# Patient Record
Sex: MALE | Race: WHITE | NOT HISPANIC OR LATINO | Employment: OTHER | URBAN - METROPOLITAN AREA
[De-identification: names, ages, dates, MRNs, and addresses within clinical notes are randomized per-mention and may not be internally consistent; named-entity substitution may affect disease eponyms.]

---

## 2023-03-22 LAB — PROSTATE SPECIFIC AG (NG/ML) IN SER/PLAS: 9.6 NG/ML (ref 0–4)

## 2023-07-18 RX ORDER — APIXABAN 5 MG/1
5 TABLET, FILM COATED ORAL EVERY 12 HOURS
COMMUNITY
Start: 2020-08-18 | End: 2023-07-19 | Stop reason: SDUPTHER

## 2023-07-18 RX ORDER — LISINOPRIL 5 MG/1
5 TABLET ORAL DAILY
COMMUNITY
Start: 2016-01-21 | End: 2023-09-25

## 2023-07-19 ENCOUNTER — OFFICE VISIT (OUTPATIENT)
Dept: PRIMARY CARE | Facility: CLINIC | Age: 73
End: 2023-07-19
Payer: MEDICARE

## 2023-07-19 VITALS
BODY MASS INDEX: 20.16 KG/M2 | HEIGHT: 65 IN | SYSTOLIC BLOOD PRESSURE: 124 MMHG | WEIGHT: 121 LBS | DIASTOLIC BLOOD PRESSURE: 76 MMHG

## 2023-07-19 DIAGNOSIS — E78.2 MIXED HYPERLIPIDEMIA: ICD-10-CM

## 2023-07-19 DIAGNOSIS — Z86.718 H/O DEEP VENOUS THROMBOSIS: ICD-10-CM

## 2023-07-19 DIAGNOSIS — R73.03 PRE-DIABETES: Primary | ICD-10-CM

## 2023-07-19 DIAGNOSIS — Z00.00 HEALTH CARE MAINTENANCE: ICD-10-CM

## 2023-07-19 DIAGNOSIS — I10 PRIMARY HYPERTENSION: ICD-10-CM

## 2023-07-19 DIAGNOSIS — R97.20 ELEVATED PSA: ICD-10-CM

## 2023-07-19 DIAGNOSIS — Z00.00 HEALTHCARE MAINTENANCE: ICD-10-CM

## 2023-07-19 PROCEDURE — 1036F TOBACCO NON-USER: CPT | Performed by: STUDENT IN AN ORGANIZED HEALTH CARE EDUCATION/TRAINING PROGRAM

## 2023-07-19 PROCEDURE — 3078F DIAST BP <80 MM HG: CPT | Performed by: STUDENT IN AN ORGANIZED HEALTH CARE EDUCATION/TRAINING PROGRAM

## 2023-07-19 PROCEDURE — 3074F SYST BP LT 130 MM HG: CPT | Performed by: STUDENT IN AN ORGANIZED HEALTH CARE EDUCATION/TRAINING PROGRAM

## 2023-07-19 PROCEDURE — 99213 OFFICE O/P EST LOW 20 MIN: CPT | Performed by: STUDENT IN AN ORGANIZED HEALTH CARE EDUCATION/TRAINING PROGRAM

## 2023-07-19 PROCEDURE — 1159F MED LIST DOCD IN RCRD: CPT | Performed by: STUDENT IN AN ORGANIZED HEALTH CARE EDUCATION/TRAINING PROGRAM

## 2023-07-19 NOTE — PROGRESS NOTES
"Subjective   Patient ID: Sanjay Morrow is a 72 y.o. male who presents for Follow-up (Med refill).    HPI   Routine fu.  Med refill.  He feels well, exercising regularly.  Review of Systems  12-point ROS reviewed and was negative unless otherwise noted in HPI.    Objective   /76   Ht 1.651 m (5' 5\")   Wt 54.9 kg (121 lb)   BMI 20.14 kg/m²     Physical Exam  GEN: conversant, NAD  HEENT: PERRL, EOMI, MMM  NECK: supple, no carotid bruits appreciated b/l  CV: S1, S2, RRR  PULM: CTAB  ABD: soft, NT, ND  NEURO: no new gross focal deficits  EXT: no sig LE edema  PSYCH: appropriate affect    Assessment/Plan     #L DVT, likely provoked: Patient found to be heterozygous for factor V Leiden mutation and heterozygous prothrombin gene mutation. Lifelong anticoagulation has been recommended by hematology, patient is in agreement with this. Continue Eliquis.     #HTN: stable. Continue lisinopril to 5 mg daily.     #Pre-diabetes: Advised dietary modification, continue exercise.     #Hyperlipidemia- Elevated LDL, but calcium score of 0. No current indication for statin.     #Elevated PSA: Follows regularly with [facility]. Multiple prostate biopsies have not shown evidence of cancer. Monitoring with surveillance.     #Health maintenance: Pneumovax given in 2016. Prevnar 13 received. TDaP given 2016. Received Shingrix. Normal colonoscopy 2019, no repeat advised. Received COVID-19 vaccines.     RTC 6 mo      "

## 2023-08-14 ENCOUNTER — LAB (OUTPATIENT)
Dept: LAB | Facility: LAB | Age: 73
End: 2023-08-14
Payer: MEDICARE

## 2023-08-14 DIAGNOSIS — Z00.00 HEALTHCARE MAINTENANCE: ICD-10-CM

## 2023-08-14 DIAGNOSIS — Z00.00 HEALTH CARE MAINTENANCE: ICD-10-CM

## 2023-08-14 LAB
ALANINE AMINOTRANSFERASE (SGPT) (U/L) IN SER/PLAS: 15 U/L (ref 10–52)
ALBUMIN (G/DL) IN SER/PLAS: 4.3 G/DL (ref 3.4–5)
ALKALINE PHOSPHATASE (U/L) IN SER/PLAS: 44 U/L (ref 33–136)
ANION GAP IN SER/PLAS: 12 MMOL/L (ref 10–20)
ASPARTATE AMINOTRANSFERASE (SGOT) (U/L) IN SER/PLAS: 18 U/L (ref 9–39)
BILIRUBIN TOTAL (MG/DL) IN SER/PLAS: 0.6 MG/DL (ref 0–1.2)
CALCIUM (MG/DL) IN SER/PLAS: 9.4 MG/DL (ref 8.6–10.3)
CARBON DIOXIDE, TOTAL (MMOL/L) IN SER/PLAS: 30 MMOL/L (ref 21–32)
CHLORIDE (MMOL/L) IN SER/PLAS: 102 MMOL/L (ref 98–107)
CHOLESTEROL (MG/DL) IN SER/PLAS: 186 MG/DL (ref 0–199)
CHOLESTEROL IN HDL (MG/DL) IN SER/PLAS: 67.1 MG/DL
CHOLESTEROL/HDL RATIO: 2.8
CREATININE (MG/DL) IN SER/PLAS: 1 MG/DL (ref 0.5–1.3)
ERYTHROCYTE DISTRIBUTION WIDTH (RATIO) BY AUTOMATED COUNT: 14.6 % (ref 11.5–14.5)
ERYTHROCYTE MEAN CORPUSCULAR HEMOGLOBIN CONCENTRATION (G/DL) BY AUTOMATED: 32.3 G/DL (ref 32–36)
ERYTHROCYTE MEAN CORPUSCULAR VOLUME (FL) BY AUTOMATED COUNT: 81 FL (ref 80–100)
ERYTHROCYTES (10*6/UL) IN BLOOD BY AUTOMATED COUNT: 5.54 X10E12/L (ref 4.5–5.9)
ESTIMATED AVERAGE GLUCOSE FOR HBA1C: 134 MG/DL
GFR MALE: 79 ML/MIN/1.73M2
GLUCOSE (MG/DL) IN SER/PLAS: 93 MG/DL (ref 74–99)
HEMATOCRIT (%) IN BLOOD BY AUTOMATED COUNT: 44.6 % (ref 41–52)
HEMOGLOBIN (G/DL) IN BLOOD: 14.4 G/DL (ref 13.5–17.5)
HEMOGLOBIN A1C/HEMOGLOBIN TOTAL IN BLOOD: 6.3 %
LDL: 100 MG/DL (ref 0–99)
LEUKOCYTES (10*3/UL) IN BLOOD BY AUTOMATED COUNT: 3.6 X10E9/L (ref 4.4–11.3)
NRBC (PER 100 WBCS) BY AUTOMATED COUNT: 0 /100 WBC (ref 0–0)
PLATELETS (10*3/UL) IN BLOOD AUTOMATED COUNT: 225 X10E9/L (ref 150–450)
POTASSIUM (MMOL/L) IN SER/PLAS: 4.8 MMOL/L (ref 3.5–5.3)
PROTEIN TOTAL: 6.6 G/DL (ref 6.4–8.2)
SODIUM (MMOL/L) IN SER/PLAS: 139 MMOL/L (ref 136–145)
TRIGLYCERIDE (MG/DL) IN SER/PLAS: 96 MG/DL (ref 0–149)
UREA NITROGEN (MG/DL) IN SER/PLAS: 17 MG/DL (ref 6–23)
VLDL: 19 MG/DL (ref 0–40)

## 2023-08-14 PROCEDURE — 85027 COMPLETE CBC AUTOMATED: CPT

## 2023-08-14 PROCEDURE — 36415 COLL VENOUS BLD VENIPUNCTURE: CPT

## 2023-08-14 PROCEDURE — 83036 HEMOGLOBIN GLYCOSYLATED A1C: CPT

## 2023-08-14 PROCEDURE — 80053 COMPREHEN METABOLIC PANEL: CPT

## 2023-08-14 PROCEDURE — 80061 LIPID PANEL: CPT

## 2023-09-07 PROBLEM — I82.409 DVT (DEEP VENOUS THROMBOSIS) (MULTI): Status: ACTIVE | Noted: 2023-09-07

## 2023-09-07 PROBLEM — I10 HTN (HYPERTENSION): Status: ACTIVE | Noted: 2023-09-07

## 2023-09-07 PROBLEM — E78.5 HYPERLIPIDEMIA: Status: ACTIVE | Noted: 2023-09-07

## 2023-09-07 PROBLEM — R97.20 ELEVATED PSA: Status: ACTIVE | Noted: 2023-09-07

## 2023-09-07 PROBLEM — R73.01 ELEVATED FASTING GLUCOSE: Status: ACTIVE | Noted: 2023-09-07

## 2023-09-07 PROBLEM — D68.51 HETEROZYGOUS FACTOR V LEIDEN MUTATION (MULTI): Status: ACTIVE | Noted: 2023-09-07

## 2023-09-07 PROBLEM — R73.03 PRE-DIABETES: Status: ACTIVE | Noted: 2023-09-07

## 2023-09-07 PROBLEM — D50.9 IRON DEFICIENCY ANEMIA: Status: ACTIVE | Noted: 2023-09-07

## 2023-09-25 DIAGNOSIS — I10 ESSENTIAL (PRIMARY) HYPERTENSION: ICD-10-CM

## 2023-09-25 RX ORDER — LISINOPRIL 5 MG/1
5 TABLET ORAL DAILY
Qty: 90 TABLET | Refills: 1 | Status: SHIPPED | OUTPATIENT
Start: 2023-09-25 | End: 2024-03-22

## 2023-10-11 ENCOUNTER — OFFICE VISIT (OUTPATIENT)
Dept: VASCULAR SURGERY | Facility: CLINIC | Age: 73
End: 2023-10-11
Payer: MEDICARE

## 2023-10-11 VITALS
WEIGHT: 121 LBS | HEART RATE: 78 BPM | BODY MASS INDEX: 20.16 KG/M2 | DIASTOLIC BLOOD PRESSURE: 74 MMHG | SYSTOLIC BLOOD PRESSURE: 118 MMHG | HEIGHT: 65 IN | OXYGEN SATURATION: 99 %

## 2023-10-11 DIAGNOSIS — Z86.718 HX OF DEEP VENOUS THROMBOSIS: ICD-10-CM

## 2023-10-11 DIAGNOSIS — M79.89 LEG SWELLING: ICD-10-CM

## 2023-10-11 DIAGNOSIS — I87.2 VENOUS (PERIPHERAL) INSUFFICIENCY: ICD-10-CM

## 2023-10-11 PROCEDURE — 99213 OFFICE O/P EST LOW 20 MIN: CPT | Performed by: SURGERY

## 2023-10-11 PROCEDURE — 1036F TOBACCO NON-USER: CPT | Performed by: SURGERY

## 2023-10-11 PROCEDURE — 1159F MED LIST DOCD IN RCRD: CPT | Performed by: SURGERY

## 2023-10-11 PROCEDURE — 3078F DIAST BP <80 MM HG: CPT | Performed by: SURGERY

## 2023-10-11 PROCEDURE — 3074F SYST BP LT 130 MM HG: CPT | Performed by: SURGERY

## 2023-10-11 PROCEDURE — 1160F RVW MEDS BY RX/DR IN RCRD: CPT | Performed by: SURGERY

## 2023-10-11 RX ORDER — MULTIVITAMIN
1 TABLET ORAL DAILY
COMMUNITY

## 2023-10-11 NOTE — PROGRESS NOTES
"Matty Morrow is a 73 y.o. male     Subjective   This patient presents today for follow-up of his venous issues of his left lower extremity.  He did develop a significant DVT of his left lower extremity probably around 2019.  He has a hypercoagulable state with factor V Leiden.  He is on oral anticoagulation.  He does admit to some swelling of his left calf on a chronic basis.  He currently denies any fever chills nausea vomiting or headache         Objective     Vitals:    10/11/23 0700   BP: 118/74   Pulse: 78   SpO2: 99%      Physical Exam  This patient is alert and oriented to distress his head is normocephalic.  His pupils are equal and reactive to light accommodation.  Neck is soft and supple without palpable lymph nodes.  Heart is regular rate.  Lungs are clear.  Abdomen soft with positive bowel sounds no pain on palpation.  Upper and lower extremities have adequate range of motion with palpable brachial radial femoral popliteal and pedal pulses.  Skin turgor is slightly decreased in his left lower leg.  Psychologically he appears to be acting appropriately.  His left calf volume is larger than his right  Blood pressure 118/74, pulse 78, height 1.651 m (5' 5\"), weight 54.9 kg (121 lb), SpO2 99 %.            Patient Active Problem List    Diagnosis Date Noted    DVT (deep venous thrombosis) (CMS/[facility]) 09/07/2023    Elevated fasting glucose 09/07/2023    Elevated PSA 09/07/2023    Heterozygous factor V Leiden mutation (CMS/[facility]) 09/07/2023    HTN (hypertension) 09/07/2023    Hyperlipidemia 09/07/2023    Iron deficiency anemia 09/07/2023    Pre-diabetes 09/07/2023          Current Outpatient Medications:     apixaban (Eliquis) 5 mg tablet, Take 1 tablet (5 mg) by mouth every 12 hours., Disp: 180 tablet, Rfl: 1    lisinopril 5 mg tablet, TAKE 1 TABLET BY MOUTH EVERY DAY, Disp: 90 tablet, Rfl: 1    multivitamin tablet, Take 1 tablet by mouth once daily., Disp: , Rfl:      Lab Results   Component Value Date    WBC 3.6 " (L) 08/14/2023    HGB 14.4 08/14/2023    HCT 44.6 08/14/2023     08/14/2023    CHOL 186 08/14/2023    TRIG 96 08/14/2023    HDL 67.1 08/14/2023    ALT 15 08/14/2023    AST 18 08/14/2023     08/14/2023    K 4.8 08/14/2023     08/14/2023    CREATININE 1.00 08/14/2023    BUN 17 08/14/2023    CO2 30 08/14/2023    TSH 0.67 07/11/2019    PSA 9.60 (H) 03/22/2023    INR 1.1 08/02/2020    HGBA1C 6.3 (A) 08/14/2023       Lower extremity venous duplex left    Result Date: 8/2/2020  MRN: [id number] Patient Name: FADIA BARRETO  STUDY: DUPLEX LOWER EXTREMITY VEINS, LEFT, UNILATERAL;  8/2/2020 8:04 am  INDICATION: Leg Pain and swelling.  COMPARISON: None.  ACCESSION NUMBER(S): [id number]  ORDERING CLINICIAN: ELSY RUBIN  TECHNIQUE: Grayscale, color and spectral Doppler ultrasound evaluation of the left lower extremity deep venous system.  FINDINGS:   Left: There is non-compressibility, thrombus and vessel enlargement of common femoral vein and at the junction of the greater saphenous vein. There is partially occlusive acute thrombus in the proximal to distal femoral vein, popliteal vein, and peroneal and posterior tibial veins. No flow is identified in these vessels with color Doppler.  Right: There is normal, symmetric venous spectral Doppler waveform in the common femoral vein.  IMPRESSION: Acute partially occlusive thrombus in the left femoral vein, popliteal, peroneal, and posterior tibial veins with occlusive possibly chronic thrombus at the left common femoral vein and junction of greater saphenous vein.       There are no new results to discuss         Assessment/Plan   Active Problems:  There are no active Hospital Problems.      This patient presents today for follow-up of his history of DVT left leg and swelling.  He has never smoked.  He is he is 5 foot 5 and weighs 121 pounds.  He does wear his compression stockings on a very consistent basis.  He does have a hypercoagulable state with factor V Leiden.   He is on lifelong anticoagulation.  His left calf volume is larger than his right.  Overall, I am very pleased with his effort in his overall status.  I would like him to follow-up in 1 year with a repeat venous duplex scan and reflux study of his left leg.      Cornell Latham MD

## 2024-01-11 ENCOUNTER — OFFICE VISIT (OUTPATIENT)
Dept: PRIMARY CARE | Facility: CLINIC | Age: 74
End: 2024-01-11
Payer: MEDICARE

## 2024-01-11 VITALS
HEIGHT: 65 IN | DIASTOLIC BLOOD PRESSURE: 76 MMHG | SYSTOLIC BLOOD PRESSURE: 132 MMHG | BODY MASS INDEX: 20.33 KG/M2 | WEIGHT: 122 LBS

## 2024-01-11 DIAGNOSIS — Z86.718 H/O DEEP VENOUS THROMBOSIS: ICD-10-CM

## 2024-01-11 DIAGNOSIS — Z00.00 ENCOUNTER FOR PREVENTIVE HEALTH EXAMINATION: ICD-10-CM

## 2024-01-11 DIAGNOSIS — E78.2 MIXED HYPERLIPIDEMIA: ICD-10-CM

## 2024-01-11 DIAGNOSIS — Z00.00 HEALTH CARE MAINTENANCE: ICD-10-CM

## 2024-01-11 DIAGNOSIS — R73.03 PRE-DIABETES: ICD-10-CM

## 2024-01-11 DIAGNOSIS — Z00.00 ROUTINE GENERAL MEDICAL EXAMINATION AT HEALTH CARE FACILITY: Primary | ICD-10-CM

## 2024-01-11 DIAGNOSIS — R97.20 ELEVATED PSA: ICD-10-CM

## 2024-01-11 DIAGNOSIS — Z00.00 MEDICARE ANNUAL WELLNESS VISIT, SUBSEQUENT: ICD-10-CM

## 2024-01-11 DIAGNOSIS — I10 PRIMARY HYPERTENSION: ICD-10-CM

## 2024-01-11 DIAGNOSIS — L98.9 SKIN LESION: ICD-10-CM

## 2024-01-11 PROCEDURE — 1159F MED LIST DOCD IN RCRD: CPT | Performed by: STUDENT IN AN ORGANIZED HEALTH CARE EDUCATION/TRAINING PROGRAM

## 2024-01-11 PROCEDURE — 99397 PER PM REEVAL EST PAT 65+ YR: CPT | Performed by: STUDENT IN AN ORGANIZED HEALTH CARE EDUCATION/TRAINING PROGRAM

## 2024-01-11 PROCEDURE — 1170F FXNL STATUS ASSESSED: CPT | Performed by: STUDENT IN AN ORGANIZED HEALTH CARE EDUCATION/TRAINING PROGRAM

## 2024-01-11 PROCEDURE — 3078F DIAST BP <80 MM HG: CPT | Performed by: STUDENT IN AN ORGANIZED HEALTH CARE EDUCATION/TRAINING PROGRAM

## 2024-01-11 PROCEDURE — 1160F RVW MEDS BY RX/DR IN RCRD: CPT | Performed by: STUDENT IN AN ORGANIZED HEALTH CARE EDUCATION/TRAINING PROGRAM

## 2024-01-11 PROCEDURE — 99213 OFFICE O/P EST LOW 20 MIN: CPT | Performed by: STUDENT IN AN ORGANIZED HEALTH CARE EDUCATION/TRAINING PROGRAM

## 2024-01-11 PROCEDURE — 3075F SYST BP GE 130 - 139MM HG: CPT | Performed by: STUDENT IN AN ORGANIZED HEALTH CARE EDUCATION/TRAINING PROGRAM

## 2024-01-11 PROCEDURE — 1036F TOBACCO NON-USER: CPT | Performed by: STUDENT IN AN ORGANIZED HEALTH CARE EDUCATION/TRAINING PROGRAM

## 2024-01-11 PROCEDURE — G0439 PPPS, SUBSEQ VISIT: HCPCS | Performed by: STUDENT IN AN ORGANIZED HEALTH CARE EDUCATION/TRAINING PROGRAM

## 2024-01-11 ASSESSMENT — ACTIVITIES OF DAILY LIVING (ADL)
DOING_HOUSEWORK: INDEPENDENT
BATHING: INDEPENDENT
MANAGING_FINANCES: INDEPENDENT
TAKING_MEDICATION: INDEPENDENT
GROCERY_SHOPPING: INDEPENDENT
DRESSING: INDEPENDENT

## 2024-01-11 ASSESSMENT — ENCOUNTER SYMPTOMS
DEPRESSION: 0
OCCASIONAL FEELINGS OF UNSTEADINESS: 0
LOSS OF SENSATION IN FEET: 0

## 2024-01-11 NOTE — PROGRESS NOTES
"Subjective   Patient ID: Matty Morrow is a 73 y.o. male who presents for Medicare Annual Wellness Visit Subsequent (Med refill).    HPI   Routine fu, yearly physical, and wellness exam.  Med refill.  He feels well in general.  He is exercising regularly.  He has a spot on the top of his right ear that has been ulcerated for a few months, non-healing.  Review of Systems  12-point ROS reviewed and was negative unless otherwise noted in HPI.    Objective   /76   Ht 1.651 m (5' 5\")   Wt 55.3 kg (122 lb)   BMI 20.30 kg/m²     Physical Exam  GEN: conversant, NAD  HEENT: PERRL, EOMI, MMM  NECK: supple, no carotid bruits appreciated b/l  CV: S1, S2, RRR  PULM: CTAB  ABD: soft, NT, ND  NEURO: no new gross focal deficits  EXT: no sig LE edema  PSYCH: appropriate affect    Assessment/Plan     #Skin lesion: on ear. Referral to dermatology.    #L DVT, likely provoked: Patient found to be heterozygous for factor V Leiden mutation and heterozygous prothrombin gene mutation. Lifelong anticoagulation has been recommended by hematology, patient is in agreement with this. Continue Eliquis.     #HTN: stable. Continue lisinopril to 5 mg daily.     #Pre-diabetes: Advised dietary modification, continue exercise.     #Hyperlipidemia- Elevated LDL, but calcium score of 0. No current indication for statin.     #Elevated PSA: Follows regularly with [facility]. Multiple prostate biopsies have not shown evidence of cancer. Monitoring with surveillance.     #Health maintenance: Pneumovax given in 2016. Prevnar 13 received. TDaP given 2016. Received Shingrix. Advised RSV vaccine. Normal colonoscopy 2019, no repeat advised. Received COVID-19 vaccines.     RTC 6 mo      "

## 2024-03-07 ENCOUNTER — APPOINTMENT (OUTPATIENT)
Dept: RADIOLOGY | Facility: HOSPITAL | Age: 74
End: 2024-03-07
Payer: MEDICARE

## 2024-03-07 ENCOUNTER — HOSPITAL ENCOUNTER (EMERGENCY)
Facility: HOSPITAL | Age: 74
Discharge: HOME | End: 2024-03-07
Payer: MEDICARE

## 2024-03-07 VITALS
HEART RATE: 80 BPM | HEIGHT: 65 IN | OXYGEN SATURATION: 99 % | SYSTOLIC BLOOD PRESSURE: 122 MMHG | DIASTOLIC BLOOD PRESSURE: 62 MMHG | RESPIRATION RATE: 16 BRPM | BODY MASS INDEX: 20.83 KG/M2 | TEMPERATURE: 97.3 F | WEIGHT: 125 LBS

## 2024-03-07 DIAGNOSIS — S61.213A LACERATION OF LEFT MIDDLE FINGER WITHOUT FOREIGN BODY WITHOUT DAMAGE TO NAIL, INITIAL ENCOUNTER: Primary | ICD-10-CM

## 2024-03-07 PROCEDURE — 99283 EMERGENCY DEPT VISIT LOW MDM: CPT | Mod: 25

## 2024-03-07 PROCEDURE — 73140 X-RAY EXAM OF FINGER(S): CPT | Mod: LT

## 2024-03-07 PROCEDURE — 90715 TDAP VACCINE 7 YRS/> IM: CPT | Performed by: NURSE PRACTITIONER

## 2024-03-07 PROCEDURE — 73120 X-RAY EXAM OF HAND: CPT | Mod: LEFT SIDE | Performed by: RADIOLOGY

## 2024-03-07 PROCEDURE — 2500000004 HC RX 250 GENERAL PHARMACY W/ HCPCS (ALT 636 FOR OP/ED): Performed by: NURSE PRACTITIONER

## 2024-03-07 PROCEDURE — 90471 IMMUNIZATION ADMIN: CPT | Performed by: NURSE PRACTITIONER

## 2024-03-07 PROCEDURE — 12002 RPR S/N/AX/GEN/TRNK2.6-7.5CM: CPT | Performed by: NURSE PRACTITIONER

## 2024-03-07 PROCEDURE — 73140 X-RAY EXAM OF FINGER(S): CPT | Mod: LEFT SIDE | Performed by: RADIOLOGY

## 2024-03-07 RX ORDER — LIDOCAINE HYDROCHLORIDE 10 MG/ML
5 INJECTION INFILTRATION; PERINEURAL ONCE
Status: DISCONTINUED | OUTPATIENT
Start: 2024-03-07 | End: 2024-03-07 | Stop reason: HOSPADM

## 2024-03-07 RX ADMIN — TETANUS TOXOID, REDUCED DIPHTHERIA TOXOID AND ACELLULAR PERTUSSIS VACCINE, ADSORBED 0.5 ML: 5; 2.5; 8; 8; 2.5 SUSPENSION INTRAMUSCULAR at 20:04

## 2024-03-07 ASSESSMENT — COLUMBIA-SUICIDE SEVERITY RATING SCALE - C-SSRS
6. HAVE YOU EVER DONE ANYTHING, STARTED TO DO ANYTHING, OR PREPARED TO DO ANYTHING TO END YOUR LIFE?: NO
1. IN THE PAST MONTH, HAVE YOU WISHED YOU WERE DEAD OR WISHED YOU COULD GO TO SLEEP AND NOT WAKE UP?: NO
2. HAVE YOU ACTUALLY HAD ANY THOUGHTS OF KILLING YOURSELF?: NO

## 2024-03-07 ASSESSMENT — PAIN SCALES - GENERAL: PAINLEVEL_OUTOF10: 0 - NO PAIN

## 2024-03-07 ASSESSMENT — PAIN - FUNCTIONAL ASSESSMENT: PAIN_FUNCTIONAL_ASSESSMENT: 0-10

## 2024-03-08 NOTE — ED PROVIDER NOTES
HPI   Chief Complaint   Patient presents with    Finger Laceration     Left middle finger laceration from bowl x30 minutes PTA. Pt on Eliquis       Patient is a 73-year-old male with past medical history of DVT and factor V Leiden who is currently taking Eliquis presents ED today after a laceration to his left, dominant hand, middle finger.  Patient states that he does not know when his last tetanus shot was.  He denies any distal numbness or weakness.  Bleeding is currently controlled after pressure on the site.      History provided by:  Patient   used: No                        Harry Coma Scale Score: 15                     Patient History   History reviewed. No pertinent past medical history.  Past Surgical History:   Procedure Laterality Date    CATARACT EXTRACTION  01/25/2016    Cataract Surgery     Family History   Problem Relation Name Age of Onset    Hypertension Mother      Hydrocephalus Father       Social History     Tobacco Use    Smoking status: Never    Smokeless tobacco: Never   Vaping Use    Vaping Use: Never used   Substance Use Topics    Alcohol use: Yes     Comment: social    Drug use: Not Currently       Physical Exam   ED Triage Vitals [03/07/24 1937]   Temperature Heart Rate Respirations BP   36.3 °C (97.3 °F) 80 16 122/62      Pulse Ox Temp Source Heart Rate Source Patient Position   99 % Skin Monitor Sitting      BP Location FiO2 (%)     -- 21 %       Physical Exam  Vitals and nursing note reviewed.   Constitutional:       General: He is not in acute distress.     Appearance: He is well-developed.   HENT:      Head: Normocephalic and atraumatic.   Eyes:      Conjunctiva/sclera: Conjunctivae normal.   Cardiovascular:      Rate and Rhythm: Normal rate and regular rhythm.      Heart sounds: No murmur heard.  Pulmonary:      Effort: Pulmonary effort is normal. No respiratory distress.      Breath sounds: Normal breath sounds.   Abdominal:      Palpations: Abdomen is soft.       Tenderness: There is no abdominal tenderness.   Musculoskeletal:         General: No swelling.      Left wrist: Normal pulse.      Right hand: Normal.      Left hand: Laceration (6 cm laceration along the ulnar aspect of the third digit.) present. No bony tenderness. Normal range of motion. Normal strength. Normal sensation.      Cervical back: Neck supple.      Comments: HAND LACERATION:     HAND EXAM: Exam of the hand shows a 6 centimeter laceration over the ulnar aspect of the third digit. It appears to be fairly superficial. There are no foreign bodies. Is neurovascularly intact distally. Specifically, has full strength with flexion and extension. Was examined  through full range of motion with no obvious tendon injury.   Skin:     General: Skin is warm and dry.      Capillary Refill: Capillary refill takes less than 2 seconds.   Neurological:      Mental Status: He is alert.   Psychiatric:         Mood and Affect: Mood normal.         ED Course & MDM   ED Course as of 03/07/24 2034   Thu Mar 07, 2024   2034 XR fingers left 2+ views  No acute fracture. No radiopaque foreign body is identified. [WS]      ED Course User Index  [WS] Raoul Arias, FLACO-CNP         Diagnoses as of 03/07/24 2034   Laceration of left middle finger without foreign body without damage to nail, initial encounter       Medical Decision Making  Differential diagnosis: Finger laceration, tendon laceration, nerve laceration, open fracture.  Patient's vital signs are stable.  Patient's tetanus vaccination was updated today, x-ray imaging reveals no open fracture or radiopaque foreign body.  Patient's examination shows no obvious tendon laceration, patient has distal sensation, no gross nerve laceration.  Laceration was repaired, see procedure note. Patient advised to return to the ED if sutures removed in 10 to 14 days.  Wound precautions discussed and patient verbalized understanding of these.    I discussed the differential, results  and discharge plan with the patient.  I emphasized the importance of follow-up with the physician I referred them to in the timeframe recommended.  I explained reasons for the them to return to the Emergency Department. Additional verbal discharge instructions were also given and discussed with them to supplement those generated by the EMR. We also discussed medications that were prescribed (if any) including common side effects and interactions. All questions were addressed.  They understand return precautions and discharge instructions. They expressed understanding.            Procedure  Laceration Repair    Performed by: BONITA Zavala  Authorized by: BONITA Zavala    Consent:     Consent obtained:  Verbal    Consent given by:  Patient    Risks, benefits, and alternatives were discussed: yes      Risks discussed:  Infection, pain, retained foreign body, need for additional repair, poor cosmetic result, poor wound healing, nerve damage and vascular damage    Alternatives discussed:  No treatment, delayed treatment, observation and referral  Universal protocol:     Procedure explained and questions answered to patient or proxy's satisfaction: yes      Relevant documents present and verified: yes      Test results available: yes      Imaging studies available: yes      Required blood products, implants, devices, and special equipment available: yes      Immediately prior to procedure, a time out was called: yes      Patient identity confirmed:  Verbally with patient and arm band  Anesthesia:     Anesthesia method:  Nerve block    Block needle gauge:  25 G    Block anesthetic:  Lidocaine 1% w/o epi    Block technique:  Digital ring block    Block injection procedure:  Anatomic landmarks identified, introduced needle, incremental injection, anatomic landmarks palpated and negative aspiration for blood    Block outcome:  Anesthesia achieved  Laceration details:     Location:  Finger    Finger  location:  L long finger    Length (cm):  6  Pre-procedure details:     Preparation:  Patient was prepped and draped in usual sterile fashion  Exploration:     Limited defect created (wound extended): no      Hemostasis achieved with:  Direct pressure    Imaging outcome: foreign body not noted      Wound exploration: wound explored through full range of motion    Treatment:     Area cleansed with:  Saline    Amount of cleaning:  Extensive    Irrigation solution:  Sterile saline    Irrigation method:  Pressure wash    Scar revision: no    Skin repair:     Repair method:  Sutures    Suture size:  4-0    Suture material:  Nylon    Suture technique:  Simple interrupted    Number of sutures:  5  Approximation:     Approximation:  Close  Repair type:     Repair type:  Simple  Post-procedure details:     Dressing:  Sterile dressing    Procedure completion:  Tolerated well, no immediate complications              Raoul Arias, FLACO-CNP  03/07/24 2032

## 2024-03-21 DIAGNOSIS — I10 ESSENTIAL (PRIMARY) HYPERTENSION: ICD-10-CM

## 2024-03-22 RX ORDER — LISINOPRIL 5 MG/1
5 TABLET ORAL DAILY
Qty: 90 TABLET | Refills: 1 | Status: SHIPPED | OUTPATIENT
Start: 2024-03-22

## 2024-03-25 ENCOUNTER — OFFICE VISIT (OUTPATIENT)
Dept: DERMATOLOGY | Facility: CLINIC | Age: 74
End: 2024-03-25
Payer: MEDICARE

## 2024-03-25 DIAGNOSIS — L57.8 OTHER SKIN CHANGES DUE TO CHRONIC EXPOSURE TO NONIONIZING RADIATION: ICD-10-CM

## 2024-03-25 DIAGNOSIS — D48.5 NEOPLASM OF UNCERTAIN BEHAVIOR OF SKIN: Primary | ICD-10-CM

## 2024-03-25 DIAGNOSIS — D18.01 HEMANGIOMA OF SKIN: ICD-10-CM

## 2024-03-25 DIAGNOSIS — L81.4 LENTIGO: ICD-10-CM

## 2024-03-25 PROCEDURE — 1159F MED LIST DOCD IN RCRD: CPT | Performed by: STUDENT IN AN ORGANIZED HEALTH CARE EDUCATION/TRAINING PROGRAM

## 2024-03-25 PROCEDURE — 69100 BIOPSY OF EXTERNAL EAR: CPT | Performed by: STUDENT IN AN ORGANIZED HEALTH CARE EDUCATION/TRAINING PROGRAM

## 2024-03-25 PROCEDURE — 1036F TOBACCO NON-USER: CPT | Performed by: STUDENT IN AN ORGANIZED HEALTH CARE EDUCATION/TRAINING PROGRAM

## 2024-03-25 PROCEDURE — 88305 TISSUE EXAM BY PATHOLOGIST: CPT | Performed by: DERMATOLOGY

## 2024-03-25 PROCEDURE — 99203 OFFICE O/P NEW LOW 30 MIN: CPT | Performed by: STUDENT IN AN ORGANIZED HEALTH CARE EDUCATION/TRAINING PROGRAM

## 2024-03-25 PROCEDURE — 1160F RVW MEDS BY RX/DR IN RCRD: CPT | Performed by: STUDENT IN AN ORGANIZED HEALTH CARE EDUCATION/TRAINING PROGRAM

## 2024-03-25 NOTE — PROGRESS NOTES
Subjective     Matty Morrow is a 73 y.o. male who presents for the following: Suspicious Skin Lesion (Patient is concerned with lesion on upper right ear. It has been there for several months with no change in shape or color. No history of skin cancer (personal or family).).     Review of Systems:  No other skin or systemic complaints other than what is documented elsewhere in the note.    The following portions of the chart were reviewed this encounter and updated as appropriate:          Skin Cancer History  No skin cancer on file.      Specialty Problems    None       Objective   Well appearing patient in no apparent distress; mood and affect are within normal limits.    A focused skin examination was performed. All findings within normal limits unless otherwise noted below.    Assessment/Plan   1. Neoplasm of uncertain behavior of skin  Right Superior Helix  6 mm hyperkeratotic papule          Lesion biopsy  Type of biopsy: tangential    Informed consent: discussed and consent obtained    Timeout: patient name, date of birth, surgical site, and procedure verified    Procedure prep:  Patient was prepped and draped  Anesthesia: the lesion was anesthetized in a standard fashion    Anesthetic:  1% lidocaine w/ epinephrine 1-100,000 local infiltration  Instrument used: DermaBlade    Hemostasis achieved with: aluminum chloride    Outcome: patient tolerated procedure well    Post-procedure details: sterile dressing applied and wound care instructions given    Dressing type: petrolatum and bandage      Staff Communication: Dermatology Local Anesthesia: 1 % Lidocaine / Epinephrine - Amount: 1 ml    Specimen 1 - Dermatopathology- DERM LAB  Differential Diagnosis: hak vs scc vs cnh  Check Margins Yes/No?:    Comments:    Dermpath Lab: Routine Histopathology (formalin-fixed tissue)    Concerning lesion found on exam. DDX for lesion includes: scc vs hak vs snh. The need for biopsy to aid in diagnosis was discussed and  recommended. Risks and benefits of biopsy were reviewed. See procedure note.    2. Hemangioma of skin  Dorsum of Nose  Bright red papules    Discussed benign nature of condition, reassured. Reviewed warning signs of skin cancer with patient.    3. Lentigo  Head  Scattered tan macules in sun-exposed areas.    Benign nature of these skin lesions reviewed and relation to sun exposure discussed. Reassurance provided. Reviewed warning signs of skin cancer.    4. Other skin changes due to chronic exposure to nonionizing radiation  Mottled pigmentation, telangiectasias and brown reticular macules in sun exposed areas on the face, extremities, trunk    The risk of chronic, cumulative sun damage and risk of development of skin cancer was reviewed with the patient today. Discussed important of sun protection with sun protective clothing and/or broad spectrum sunscreen spf 30 or above.  Warning signs of skin cancer were reviewed. Patient to contact office should they notice any new or changing pre-existing skin lesion.    Related Procedures  Follow Up In Dermatology - Established Patient

## 2024-03-27 LAB
LABORATORY COMMENT REPORT: NORMAL
PATH REPORT.FINAL DX SPEC: NORMAL
PATH REPORT.GROSS SPEC: NORMAL
PATH REPORT.MICROSCOPIC SPEC OTHER STN: NORMAL
PATH REPORT.RELEVANT HX SPEC: NORMAL
PATH REPORT.TOTAL CANCER: NORMAL

## 2024-03-28 DIAGNOSIS — C44.222 SQUAMOUS CELL CARCINOMA OF RIGHT EAR: Primary | ICD-10-CM

## 2024-03-28 NOTE — RESULT ENCOUNTER NOTE
Spoke with patient and informed him of the shaved biopsy results of the Right Superior Helix: Squamous Cell Carcinoma. Per Dr. Sarmiento, Mohs surgery is required. PA authorization sent to insurance.

## 2024-04-01 ENCOUNTER — LAB (OUTPATIENT)
Dept: LAB | Facility: LAB | Age: 74
End: 2024-04-01
Payer: MEDICARE

## 2024-04-01 DIAGNOSIS — R97.20 ELEVATED PROSTATE SPECIFIC ANTIGEN (PSA): Primary | ICD-10-CM

## 2024-04-01 LAB — PSA SERPL-MCNC: 12.43 NG/ML

## 2024-04-01 PROCEDURE — 36415 COLL VENOUS BLD VENIPUNCTURE: CPT

## 2024-04-01 PROCEDURE — 84153 ASSAY OF PSA TOTAL: CPT

## 2024-06-19 ENCOUNTER — TELEPHONE (OUTPATIENT)
Dept: PRIMARY CARE | Facility: CLINIC | Age: 74
End: 2024-06-19
Payer: MEDICARE

## 2024-06-19 DIAGNOSIS — Z00.00 HEALTH CARE MAINTENANCE: ICD-10-CM

## 2024-06-19 DIAGNOSIS — Z13.220 LIPID SCREENING: ICD-10-CM

## 2024-06-19 DIAGNOSIS — I10 PRIMARY HYPERTENSION: Primary | ICD-10-CM

## 2024-06-19 DIAGNOSIS — E78.2 MIXED HYPERLIPIDEMIA: ICD-10-CM

## 2024-06-19 DIAGNOSIS — R73.03 PRE-DIABETES: ICD-10-CM

## 2024-06-20 ENCOUNTER — LAB (OUTPATIENT)
Dept: LAB | Facility: LAB | Age: 74
End: 2024-06-20
Payer: MEDICARE

## 2024-06-20 DIAGNOSIS — Z13.220 LIPID SCREENING: ICD-10-CM

## 2024-06-20 DIAGNOSIS — Z00.00 HEALTH CARE MAINTENANCE: ICD-10-CM

## 2024-06-20 DIAGNOSIS — I10 PRIMARY HYPERTENSION: ICD-10-CM

## 2024-06-20 DIAGNOSIS — R73.03 PRE-DIABETES: ICD-10-CM

## 2024-06-20 LAB
ALBUMIN SERPL BCP-MCNC: 4.2 G/DL (ref 3.4–5)
ALP SERPL-CCNC: 46 U/L (ref 33–136)
ALT SERPL W P-5'-P-CCNC: 20 U/L (ref 10–52)
ANION GAP SERPL CALC-SCNC: 9 MMOL/L (ref 10–20)
AST SERPL W P-5'-P-CCNC: 22 U/L (ref 9–39)
BILIRUB SERPL-MCNC: 0.8 MG/DL (ref 0–1.2)
BUN SERPL-MCNC: 13 MG/DL (ref 6–23)
CALCIUM SERPL-MCNC: 9.3 MG/DL (ref 8.6–10.3)
CHLORIDE SERPL-SCNC: 101 MMOL/L (ref 98–107)
CHOLEST SERPL-MCNC: 198 MG/DL (ref 0–199)
CHOLESTEROL/HDL RATIO: 2.7
CO2 SERPL-SCNC: 32 MMOL/L (ref 21–32)
CREAT SERPL-MCNC: 1 MG/DL (ref 0.5–1.3)
EGFRCR SERPLBLD CKD-EPI 2021: 79 ML/MIN/1.73M*2
ERYTHROCYTE [DISTWIDTH] IN BLOOD BY AUTOMATED COUNT: 14.5 % (ref 11.5–14.5)
EST. AVERAGE GLUCOSE BLD GHB EST-MCNC: 120 MG/DL
GLUCOSE SERPL-MCNC: 100 MG/DL (ref 74–99)
HBA1C MFR BLD: 5.8 %
HCT VFR BLD AUTO: 46.1 % (ref 41–52)
HDLC SERPL-MCNC: 72.5 MG/DL
HGB BLD-MCNC: 14.9 G/DL (ref 13.5–17.5)
LDLC SERPL CALC-MCNC: 101 MG/DL
MCH RBC QN AUTO: 26 PG (ref 26–34)
MCHC RBC AUTO-ENTMCNC: 32.3 G/DL (ref 32–36)
MCV RBC AUTO: 80 FL (ref 80–100)
NON HDL CHOLESTEROL: 126 MG/DL (ref 0–149)
NRBC BLD-RTO: 0 /100 WBCS (ref 0–0)
PLATELET # BLD AUTO: 202 X10*3/UL (ref 150–450)
POTASSIUM SERPL-SCNC: 4.4 MMOL/L (ref 3.5–5.3)
PROT SERPL-MCNC: 6.7 G/DL (ref 6.4–8.2)
RBC # BLD AUTO: 5.74 X10*6/UL (ref 4.5–5.9)
SODIUM SERPL-SCNC: 138 MMOL/L (ref 136–145)
TRIGL SERPL-MCNC: 122 MG/DL (ref 0–149)
TSH SERPL-ACNC: 0.79 MIU/L (ref 0.44–3.98)
VLDL: 24 MG/DL (ref 0–40)
WBC # BLD AUTO: 4.1 X10*3/UL (ref 4.4–11.3)

## 2024-06-20 PROCEDURE — 80053 COMPREHEN METABOLIC PANEL: CPT

## 2024-06-20 PROCEDURE — 85027 COMPLETE CBC AUTOMATED: CPT

## 2024-06-20 PROCEDURE — 84443 ASSAY THYROID STIM HORMONE: CPT

## 2024-06-20 PROCEDURE — 83036 HEMOGLOBIN GLYCOSYLATED A1C: CPT

## 2024-06-20 PROCEDURE — 36415 COLL VENOUS BLD VENIPUNCTURE: CPT

## 2024-06-20 PROCEDURE — 80061 LIPID PANEL: CPT

## 2024-06-25 ENCOUNTER — APPOINTMENT (OUTPATIENT)
Dept: DERMATOLOGY | Facility: CLINIC | Age: 74
End: 2024-06-25
Payer: MEDICARE

## 2024-06-25 VITALS — DIASTOLIC BLOOD PRESSURE: 75 MMHG | SYSTOLIC BLOOD PRESSURE: 146 MMHG | HEART RATE: 76 BPM

## 2024-06-25 DIAGNOSIS — C44.222 SQUAMOUS CELL CARCINOMA OF SKIN OF HELIX OF RIGHT EAR: ICD-10-CM

## 2024-06-25 PROCEDURE — 17311 MOHS 1 STAGE H/N/HF/G: CPT | Performed by: DERMATOLOGY

## 2024-06-25 NOTE — PROGRESS NOTES
"Office Visit Note  Date: 6/25/2024  Surgeon:  Joey Roach MD PhD  Office Location: [address] [address]  Dept: [phone]  Dept Fax: [phone]  Referring Provider: Betina Sarmiento MD  [address] [place] [address]  [address], [address] [place] [address]    Subjective   Sanjay Morrow \"Matty\" is a 73 y.o. male who presents for the following: MOHS Surgery (Right Superior Helix, Squamous Cell Carcinoma)    According to the patient, the lesion has been present for approximately 1 month at the time of diagnosis.  The lesion is painful.  The lesion has not been treated previously.    The patient does not have a pacemaker / defibrillator.  The patient does not have a heart valve / joint replacement.    The patient is on blood thinners. (Confirms use of Eliquis)  The patient does not have a history of hepatitis B or C.  The patient does not have a history of HIV.  The patient does not have a history of immunosuppression (e.g. organ transplantation, malignancy, medications)    Review of Systems:  No other skin or systemic complaints other than what is documented elsewhere in the note.    MEDICAL HISTORY: clinically relevant history including significant past medical history, medications and allergies was reviewed and documented in Epic.    Objective   Well appearing patient in no apparent distress; mood and affect are within normal limits.  Vital signs: See record.  Noted on the Right Superior Helix  Is a 0.5 x 0.3 cm scar              The patient confirmed the identified site.    Discussion:  The nature of the diagnosis was explained. The lesion is a skin cancer.  It has a risk of local growth and distant spread. The condition is associated with sun exposure.  Warning signs of non-melanoma skin cancer discussed. Patient was instructed to perform monthly self skin examination.  We recommended that the patient have regular full skin exams given an increased risk of " subsequent skin cancers. The patient was instructed to use sun protective behaviors including use of broad spectrum sunscreens and sun protective clothing to reduce risk of skin cancers.      Risks, benefits, side effects of Mohs surgery were discussed with patient and the patient voiced understanding.  It was explained that even though the cure rate of Mohs is very high it is not 100%. Risks of surgery including but not limited to bleeding, infection, numbness, nerve damage, and scar were reviewed.  Discussion included wound care requirements, activity restrictions, likely scar outcome and time to heal.     After Mohs surgery, the defect may need to be repaired surgically and the scar may be longer than the original lesion.  Reconstruction options, risks, and benefits were reviewed including second intention healing, linear repair (4-1 ratio was explained), local flaps, skin grafts, cartilage grafts and interpolation flaps (the need for multiple surgeries was explained). Possible outcomes were reviewed including likely scar appearance, failure of flap survival, infection, bleeding and the need for revision surgery.     The pathology was reviewed, the photograph was reviewed, and the referring physician's note was reviewed.    Patient elected for Mohs surgery.

## 2024-06-25 NOTE — PROGRESS NOTES
Mohs Surgery Operative Note    Date of Surgery:  6/25/2024  Surgeon:  Joey Roach MD PhD  Office Location: [address] [address]  Dept: [phone]  Dept Fax: [phone]  Referring Provider: Betina Sarmiento MD  [address] [place] [address]  [address], [address] [address]      Assessment/Plan   Pre-procedure:   Obtained informed consent: written from patient  The surgical site was identified and confirmed with the patient.     Intra-operative:   Audible time out called at : 10:56 AM 06/25/24  by: Meri Bey MA   Verified patient name, birthdate, site, specimen bottle label & requisition.    The planned procedure(s) was again reviewed with the patient. The risks of bleeding, infection, nerve damage and scarring were reviewed. Written authorization was obtained. The patient identity, surgical site, and planned procedure(s) were verified. The provider acted as both surgeon and pathologist.     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  Right Superior [place]    Mohs surgery    Consent obtained: written    Universal Protocol:  Procedure explained and questions answered to patient or proxy's satisfaction: Yes    Test results available and properly labeled: Yes    Pathology report reviewed: Yes    External notes reviewed: Yes    Photo or diagram used for site identification: Yes    Site/side marked: Yes    Slide independently reviewed by Mohs surgeon: Yes    Immediately prior to procedure a time out was called: Yes    Patient identity confirmed: verbally with patient  Preparation: Patient was prepped and draped in usual sterile fashion      Anticoagulation:  Is the patient taking prescription anticoagulant and/or aspirin prescribed/recommended by a physician? Yes    Was the anticoagulation regimen changed prior to Mohs? No      Anesthesia:  Anesthesia method: local infiltration  Local anesthetic: lidocaine 1% WITH epi and sodium  bicarbonate    Procedure Details:  Biopsy accession number: [id number]  Date of biopsy: 3/25/2024  Frozen section biopsy performed: No    Specimen debulked: No    Pre-Op diagnosis: squamous cell carcinoma  SCC subtype: well differentiated.  Surgery side: right  Surgical site (from skin exam): Right Superior [place]  Pre-operative length (cm): 0.5  Pre-operative width (cm): 0.3  Indications for Mohs surgery: anatomic location where tissue conservation is critical  Previously treated? No      Micrographic Surgery Details:  Post-operative length (cm): 1.1  Post-operative width (cm): 0.9  Number of Mohs stages: 1  Is this a complex case (associate members only): No      Stage 1     Comments: The patient was brought into the operating room and placed in the procedure chair in the appropriate position.  The area positive by previous biopsy was identified and confirmed with the patient. The area of clinically obvious tumor was debulked using a curette and/or scalpel as needed. An incision was made following the Mohs approach through the skin. The specimen was taken to the lab, divided into 2 piece(s) and appropriately chromacoded and processed.     Tumor features identified on Mohs section: squamous cell carcinoma    Depth of defect: subcutaneous fat    Patient tolerance of procedure: tolerated well, no immediate complications    Reconstruction:  Was the defect reconstructed?: No (Defect to granulate by secondary intention.)    Fine/surface layer approximation (top stitches)   Hemostasis achieved with: pressure and electrodesiccation  Outcome: patient tolerated procedure well with no complications and patient tolerated procedure with difficulty    Post-procedure details: sterile dressing applied and wound care instructions given    Dressing type: pressure dressing, petrolatum, Hypafix, Telfa pad and gauze roll        Repair: After a discussion with the patient regarding the options for wound closure, a decision was made to  proceed with second intention healing.  Dressing F/U: Surgifoam was placed in the wound. A pressure dressing was placed to help stabilize the wound and to minimize the risk of postoperative bleeding. Wound care was discussed, and the patient was given written post-operative wound care instructions.    Wound care was discussed, and the patient was given written post-operative wound care instructions.      The patient will follow up with Joey Roach MD PhD as needed for any post operative problems or concerns, and will follow up with their primary dermatologist as scheduled.

## 2024-07-02 ENCOUNTER — APPOINTMENT (OUTPATIENT)
Dept: PRIMARY CARE | Facility: CLINIC | Age: 74
End: 2024-07-02
Payer: MEDICARE

## 2024-07-02 VITALS — DIASTOLIC BLOOD PRESSURE: 62 MMHG | SYSTOLIC BLOOD PRESSURE: 110 MMHG | BODY MASS INDEX: 19.97 KG/M2 | WEIGHT: 120 LBS

## 2024-07-02 DIAGNOSIS — R73.03 PRE-DIABETES: ICD-10-CM

## 2024-07-02 DIAGNOSIS — C44.222 SQUAMOUS CELL CARCINOMA OF EAR, RIGHT: ICD-10-CM

## 2024-07-02 DIAGNOSIS — I10 PRIMARY HYPERTENSION: ICD-10-CM

## 2024-07-02 DIAGNOSIS — R97.20 ELEVATED PSA: Primary | ICD-10-CM

## 2024-07-02 DIAGNOSIS — E78.2 MIXED HYPERLIPIDEMIA: ICD-10-CM

## 2024-07-02 DIAGNOSIS — Z00.00 HEALTHCARE MAINTENANCE: ICD-10-CM

## 2024-07-02 PROCEDURE — 1036F TOBACCO NON-USER: CPT | Performed by: STUDENT IN AN ORGANIZED HEALTH CARE EDUCATION/TRAINING PROGRAM

## 2024-07-02 PROCEDURE — 3078F DIAST BP <80 MM HG: CPT | Performed by: STUDENT IN AN ORGANIZED HEALTH CARE EDUCATION/TRAINING PROGRAM

## 2024-07-02 PROCEDURE — 3074F SYST BP LT 130 MM HG: CPT | Performed by: STUDENT IN AN ORGANIZED HEALTH CARE EDUCATION/TRAINING PROGRAM

## 2024-07-02 PROCEDURE — 99214 OFFICE O/P EST MOD 30 MIN: CPT | Performed by: STUDENT IN AN ORGANIZED HEALTH CARE EDUCATION/TRAINING PROGRAM

## 2024-07-02 PROCEDURE — 1159F MED LIST DOCD IN RCRD: CPT | Performed by: STUDENT IN AN ORGANIZED HEALTH CARE EDUCATION/TRAINING PROGRAM

## 2024-07-02 PROCEDURE — G2211 COMPLEX E/M VISIT ADD ON: HCPCS | Performed by: STUDENT IN AN ORGANIZED HEALTH CARE EDUCATION/TRAINING PROGRAM

## 2024-07-02 ASSESSMENT — PATIENT HEALTH QUESTIONNAIRE - PHQ9
SUM OF ALL RESPONSES TO PHQ9 QUESTIONS 1 AND 2: 0
1. LITTLE INTEREST OR PLEASURE IN DOING THINGS: NOT AT ALL
2. FEELING DOWN, DEPRESSED OR HOPELESS: NOT AT ALL

## 2024-07-02 NOTE — PROGRESS NOTES
Subjective   Patient ID: Matty Morrow is a 73 y.o. male who presents for Annual Exam (physical).    HPI   Routine fu.    He feels well in general.    He wants to review recent labs.    He had recent Mohs surgery to remove squamous cell carcinoma from his right ear.    Review of Systems  12-point ROS reviewed and was negative unless otherwise noted in HPI.    Objective   /62   Wt 54.4 kg (120 lb)   BMI 19.97 kg/m²     Physical Exam  GEN: conversant, NAD  HEENT: PERRL, EOMI, MMM  NECK: supple, no carotid bruits appreciated b/l  CV: S1, S2, RRR  PULM: CTAB  ABD: soft, NT, ND  NEURO: no new gross focal deficits  EXT: no sig LE edema  PSYCH: appropriate affect    Assessment/Plan     #Squamous cell carcinoma: of right ear, seeing dermatology     #L DVT, likely provoked: Patient found to be heterozygous for factor V Leiden mutation and heterozygous prothrombin gene mutation. Lifelong anticoagulation has been recommended by hematology, patient is in agreement with this. Continue Eliquis.     #HTN: stable. Continue lisinopril to 5 mg daily.     #Pre-diabetes: Advised dietary modification, continue exercise.     #Hyperlipidemia- Elevated LDL, but calcium score of 0. No current indication for statin.     #Elevated PSA: Follows regularly with [facility]. Multiple prostate biopsies have not shown evidence of cancer. Monitoring with surveillance.     #Health maintenance: Pneumovax given in 2016. Prevnar 13 received. TDaP given 2016. Received Shingrix. Normal colonoscopy 2019, no repeat advised. Longitudinal care. Labs reviewed.        RTC 6 mo

## 2024-07-11 DIAGNOSIS — Z86.718 H/O DEEP VENOUS THROMBOSIS: ICD-10-CM

## 2024-07-12 RX ORDER — APIXABAN 5 MG/1
5 TABLET, FILM COATED ORAL EVERY 12 HOURS
Qty: 180 TABLET | Refills: 1 | Status: SHIPPED | OUTPATIENT
Start: 2024-07-12

## 2024-09-06 DIAGNOSIS — I10 ESSENTIAL (PRIMARY) HYPERTENSION: ICD-10-CM

## 2024-09-06 RX ORDER — LISINOPRIL 5 MG/1
5 TABLET ORAL DAILY
Qty: 90 TABLET | Refills: 0 | Status: SHIPPED | OUTPATIENT
Start: 2024-09-06

## 2024-09-30 ENCOUNTER — APPOINTMENT (OUTPATIENT)
Dept: DERMATOLOGY | Facility: CLINIC | Age: 74
End: 2024-09-30
Payer: MEDICARE

## 2024-09-30 DIAGNOSIS — D18.01 HEMANGIOMA OF SKIN: Primary | ICD-10-CM

## 2024-09-30 DIAGNOSIS — Z12.83 SCREENING EXAM FOR SKIN CANCER: ICD-10-CM

## 2024-09-30 DIAGNOSIS — Z85.828 PERSONAL HISTORY OF SKIN CANCER: ICD-10-CM

## 2024-09-30 DIAGNOSIS — L81.4 LENTIGO: ICD-10-CM

## 2024-09-30 DIAGNOSIS — L57.8 OTHER SKIN CHANGES DUE TO CHRONIC EXPOSURE TO NONIONIZING RADIATION: ICD-10-CM

## 2024-09-30 PROCEDURE — 99213 OFFICE O/P EST LOW 20 MIN: CPT | Performed by: STUDENT IN AN ORGANIZED HEALTH CARE EDUCATION/TRAINING PROGRAM

## 2024-09-30 PROCEDURE — 1159F MED LIST DOCD IN RCRD: CPT | Performed by: STUDENT IN AN ORGANIZED HEALTH CARE EDUCATION/TRAINING PROGRAM

## 2024-09-30 NOTE — PROGRESS NOTES
Subjective     Matty Morrow is a 74 y.o. male who presents for the following: Skin Check (Skin exam, 6 month F/U. Hx of SCC to right superior helix, treated my Mohs on 6/26/24. Denies any concerning lesions. ).     Review of Systems:  No other skin or systemic complaints other than what is documented elsewhere in the note.    The following portions of the chart were reviewed this encounter and updated as appropriate:          Skin Cancer History  Biopsy Date Type Location Status   3/25/24 SCC Right Superior Helix Treatment Complete  6/25/24       Specialty Problems    None       Objective   Well appearing patient in no apparent distress; mood and affect are within normal limits.    A focused skin examination was performed. All findings within normal limits unless otherwise noted below.    Assessment/Plan   1. Hemangioma of skin  Dorsum of Nose  Bright red papules    Discussed benign nature of condition, reassured. Reviewed warning signs of skin cancer with patient.    2. Other skin changes due to chronic exposure to nonionizing radiation  Mottled pigmentation, telangiectasias and brown reticular macules in sun exposed areas on the face, extremities, trunk    The risk of chronic, cumulative sun damage and risk of development of skin cancer was reviewed with the patient today. Discussed important of sun protection with sun protective clothing and/or broad spectrum sunscreen spf 30 or above.  Warning signs of skin cancer were reviewed. Patient to contact office should they notice any new or changing pre-existing skin lesion.    Related Procedures  Follow Up In Dermatology - Established Patient  Follow Up In Dermatology - Established Patient    3. Lentigo  Head  Scattered tan macules in sun-exposed areas.    Benign nature of these skin lesions reviewed and relation to sun exposure discussed. Reassurance provided. Reviewed warning signs of skin cancer.    4. Personal history of skin cancer  Scar at site of prior  procedure    There is no evidence of recurrence on clinical examination today, reassurance was provided to the patient. Discussed that hx of skin cancer increases risk of developing future skin cancer and total body skin exams are warranted every 12 months    5. Screening exam for skin cancer

## 2024-10-01 ENCOUNTER — HOSPITAL ENCOUNTER (OUTPATIENT)
Dept: VASCULAR MEDICINE | Facility: HOSPITAL | Age: 74
Discharge: HOME | End: 2024-10-01
Payer: MEDICARE

## 2024-10-01 DIAGNOSIS — I87.2 VENOUS (PERIPHERAL) INSUFFICIENCY: ICD-10-CM

## 2024-10-01 DIAGNOSIS — M79.89 LEG SWELLING: ICD-10-CM

## 2024-10-01 DIAGNOSIS — Z86.718 HX OF DEEP VENOUS THROMBOSIS: ICD-10-CM

## 2024-10-01 PROCEDURE — 93971 EXTREMITY STUDY: CPT | Performed by: SURGERY

## 2024-10-01 PROCEDURE — 93971 EXTREMITY STUDY: CPT

## 2024-10-09 ENCOUNTER — APPOINTMENT (OUTPATIENT)
Dept: VASCULAR SURGERY | Facility: CLINIC | Age: 74
End: 2024-10-09
Payer: MEDICARE

## 2024-10-09 VITALS
SYSTOLIC BLOOD PRESSURE: 120 MMHG | BODY MASS INDEX: 20.16 KG/M2 | HEART RATE: 72 BPM | HEIGHT: 65 IN | WEIGHT: 121 LBS | DIASTOLIC BLOOD PRESSURE: 74 MMHG | OXYGEN SATURATION: 100 %

## 2024-10-09 DIAGNOSIS — M79.89 LEG SWELLING: ICD-10-CM

## 2024-10-09 DIAGNOSIS — I82.502 CHRONIC DEEP VEIN THROMBOSIS (DVT) OF LEFT LOWER EXTREMITY, UNSPECIFIED VEIN (MULTI): ICD-10-CM

## 2024-10-09 DIAGNOSIS — Z86.718 HISTORY OF DVT OF LOWER EXTREMITY: Primary | ICD-10-CM

## 2024-10-09 PROCEDURE — 3074F SYST BP LT 130 MM HG: CPT | Performed by: SURGERY

## 2024-10-09 PROCEDURE — 3008F BODY MASS INDEX DOCD: CPT | Performed by: SURGERY

## 2024-10-09 PROCEDURE — 3078F DIAST BP <80 MM HG: CPT | Performed by: SURGERY

## 2024-10-09 PROCEDURE — 1159F MED LIST DOCD IN RCRD: CPT | Performed by: SURGERY

## 2024-10-09 PROCEDURE — 99213 OFFICE O/P EST LOW 20 MIN: CPT | Performed by: SURGERY

## 2024-10-09 PROCEDURE — 1036F TOBACCO NON-USER: CPT | Performed by: SURGERY

## 2024-10-09 NOTE — PROGRESS NOTES
"Sanjay Morrow \"Matty\" is a 74 y.o. male     Subjective   This patient presents today for follow-up of his venous issues of his left leg.  He did develop an extensive deep vein thrombosis of his left leg a few years ago.  He has been oral anticoagulation.  He did see hematology that did a workup on him.  He does have a hypercoagulable state with factor V Leiden and also prothrombin issues.  He currently denies any fever chills nausea vomiting or headache.  He has never smoked and is currently not a diabetic.  He is 5 foot 5 and weighs 121 pounds  He does wear his compression stockings on a consistent basis.       Objective     Vitals:    10/09/24 0700   BP: 120/74   Pulse: 72   SpO2: 100%      Physical Exam  This patient is alert and oriented x3.  No acute distress.  Head is normocephalic.  Pupils are equal and reactive to light and accommodation.  Neck is soft and supple without palpable lymph nodes.  Heart is regular rate.  Lungs are clear.  Abdomen is soft with positive bowel sounds there is no pain on palpation.  Upper and lower extremities have adequate range of motion with palpable brachial radial femoral and popliteal pulses.  Skin turgor is adequate.  Psychologically the patient appears to be acting appropriately.  He does have palpable pedal pulses.  He has no significant swelling  Blood pressure 120/74, pulse 72, height 1.651 m (5' 5\"), weight 54.9 kg (121 lb), SpO2 100%.            Patient Active Problem List    Diagnosis Date Noted    DVT (deep venous thrombosis) (Multi) 09/07/2023    Elevated fasting glucose 09/07/2023    Elevated PSA 09/07/2023    Heterozygous factor V Leiden mutation (Multi) 09/07/2023    HTN (hypertension) 09/07/2023    Hyperlipidemia 09/07/2023    Iron deficiency anemia 09/07/2023    Pre-diabetes 09/07/2023          Current Outpatient Medications:     Eliquis 5 mg tablet, TAKE 1 TABLET BY MOUTH EVERY 12 HOURS., Disp: 180 tablet, Rfl: 1    lisinopril 5 mg tablet, TAKE 1 TABLET BY MOUTH " EVERY DAY, Disp: 90 tablet, Rfl: 0    multivitamin tablet, Take 1 tablet by mouth once daily., Disp: , Rfl:      Lab Results   Component Value Date    WBC 4.1 (L) 06/20/2024    HGB 14.9 06/20/2024    HCT 46.1 06/20/2024     06/20/2024    CHOL 198 06/20/2024    TRIG 122 06/20/2024    HDL 72.5 06/20/2024    ALT 20 06/20/2024    AST 22 06/20/2024     06/20/2024    K 4.4 06/20/2024     06/20/2024    CREATININE 1.00 06/20/2024    BUN 13 06/20/2024    CO2 32 06/20/2024    TSH 0.79 06/20/2024    PSA 12.43 (H) 04/01/2024    INR 1.1 08/02/2020    HGBA1C 5.8 (H) 06/20/2024       Vascular US lower extremity venous insufficiency left    Result Date: 10/1/2024           [place] [address] Tel [phone] and Fax [phone]  Vascular Lab Report VASC US LOWER EXTREMITY VENOUS INSUFFICIENCY LEFT  Patient Name:      FADIA Vargas Physician:  [id number] Zamzam Ramirez MD Study Date:        10/1/2024            Ordering Physician: [id number] MARIBEL FAIRCHILD MRN/PID:           [id number]             Technologist:       Tiffani Diaz T Accession#:        [id number]         Technologist 2: Date of Birth/Age: 1950 / 74 years Encounter#:         [id number] Gender:            M Admission Status:  Outpatient           Location Performed: [facility]  Diagnosis/ICD:    Venous insufficiency (chronic) (peripheral)-I87.2 Indication:       Varicose veins edema, pain CPT Codes:        32317 Venous reflux study VV VI Limited Patient Position: Study performed in a reverse Trendelenburg position.  CONCLUSIONS: Left Lower Venous Insufficiency: There is reflux noted in the common femoral and proximal femoral veins. No evidence of venous insufficiency was noted within the left GSV. The left SSV appears to be taking flow from the Popliteal vein,  assisting in draining venous flow from the calf. Left Lower Venous: There are chronic changes visualized in the distal external iliac, common femoral, profunda, proximal femoral, mid femoral and distal Femoral veins. Duplicating venous system is noted within the mid and distal FV. Continuous venous flow is noted in this vessel with no evidence of reflux during Shama Elder. Accessory vein is noted from the distal FV connecting to the Profunda Femoralis vein. Additional Findings; Lymph nodes noted left inguinal crease measuring 3.08 x 0.78 x 1.52cm.  Comparison: Compared with study from 10/5/2022, Chronic changes remain within the left Iliac, Common Femoral, and Femoral veins. Duplicating versus collateral system noted mid and distal FV. Accessory vein is noted from the distal FV connecting to the Profunda Femoralis vein.  Imaging & Doppler Findings:  Left           Compress Thrombus SFJ              Yes      None Prox Thigh GSV   Yes      None Mid Thigh GSV    Yes      None Knee GSV         Yes      None Prox Calf GSV    Yes      None Mid Calf GSV     Yes      None Dist Calf GSV    Yes      None  Left                  Compress Thrombus          Flow Distal External Iliac Partial  Chronic        Continuous CFV                   Partial  Chronic  Continuous with reflux PFV                   Partial  Chronic FV Proximal           Partial  Chronic  Continuous with reflux FV Mid                   No    Chronic           None FV Distal                No    Chronic           None Popliteal               Yes      None     Spontaneous/Phasic Peroneal                Yes      None PTV                     Yes      None  [id number] Zamzam Ramirez MD Electronically signed by [id number] Zamzam Ramirez MD on 10/1/2024 at 8:10:50 PM  ** Final **                 Assessment/Plan   Active Problems:  There are no active Hospital Problems.      This patient presents today for follow-up of his history of DVT left lower extremity.  He did develop  an extensive DVT of his left leg probably 4 years ago or so.  He has been on anticoagulation ever since.  He does have a hypercoagulable state that was identified with hematology.  He is at a great weight of 5 foot 5 and 121 pounds.  I am encouraging him to continue to exercise.  He did have a venous duplex scan with reflux study done of his left leg that does show significant chronic thrombus in his external iliac vein common femoral vein superficial femoral vein.  I would like him to follow-up with me in 1 year with a repeat venous duplex scan with reflux study of his left leg.      Cornell Latham, DO

## 2024-11-29 DIAGNOSIS — I10 ESSENTIAL (PRIMARY) HYPERTENSION: ICD-10-CM

## 2024-12-02 ENCOUNTER — LAB (OUTPATIENT)
Dept: LAB | Facility: LAB | Age: 74
End: 2024-12-02
Payer: MEDICARE

## 2024-12-02 DIAGNOSIS — R97.20 ELEVATED PROSTATE SPECIFIC ANTIGEN (PSA): Primary | ICD-10-CM

## 2024-12-02 LAB — PSA SERPL-MCNC: 10.36 NG/ML

## 2024-12-02 PROCEDURE — 84153 ASSAY OF PSA TOTAL: CPT

## 2024-12-02 PROCEDURE — 36415 COLL VENOUS BLD VENIPUNCTURE: CPT

## 2024-12-02 RX ORDER — LISINOPRIL 5 MG/1
5 TABLET ORAL DAILY
Qty: 90 TABLET | Refills: 0 | Status: SHIPPED | OUTPATIENT
Start: 2024-12-02

## 2025-01-07 ENCOUNTER — APPOINTMENT (OUTPATIENT)
Dept: PRIMARY CARE | Facility: CLINIC | Age: 75
End: 2025-01-07
Payer: MEDICARE

## 2025-01-07 VITALS
HEART RATE: 82 BPM | WEIGHT: 122 LBS | OXYGEN SATURATION: 99 % | BODY MASS INDEX: 20.3 KG/M2 | DIASTOLIC BLOOD PRESSURE: 58 MMHG | SYSTOLIC BLOOD PRESSURE: 119 MMHG

## 2025-01-07 DIAGNOSIS — D68.51 HETEROZYGOUS FACTOR V LEIDEN MUTATION (MULTI): ICD-10-CM

## 2025-01-07 DIAGNOSIS — Z86.718 H/O DEEP VENOUS THROMBOSIS: ICD-10-CM

## 2025-01-07 DIAGNOSIS — I10 PRIMARY HYPERTENSION: ICD-10-CM

## 2025-01-07 DIAGNOSIS — I82.4Y9 DEEP VEIN THROMBOSIS (DVT) OF PROXIMAL LOWER EXTREMITY, UNSPECIFIED CHRONICITY, UNSPECIFIED LATERALITY (MULTI): ICD-10-CM

## 2025-01-07 DIAGNOSIS — R22.32 ARM MASS, LEFT: Primary | ICD-10-CM

## 2025-01-07 DIAGNOSIS — R73.03 PRE-DIABETES: ICD-10-CM

## 2025-01-07 DIAGNOSIS — E78.2 MIXED HYPERLIPIDEMIA: ICD-10-CM

## 2025-01-07 PROCEDURE — 1159F MED LIST DOCD IN RCRD: CPT | Performed by: STUDENT IN AN ORGANIZED HEALTH CARE EDUCATION/TRAINING PROGRAM

## 2025-01-07 PROCEDURE — G2211 COMPLEX E/M VISIT ADD ON: HCPCS | Performed by: STUDENT IN AN ORGANIZED HEALTH CARE EDUCATION/TRAINING PROGRAM

## 2025-01-07 PROCEDURE — 3078F DIAST BP <80 MM HG: CPT | Performed by: STUDENT IN AN ORGANIZED HEALTH CARE EDUCATION/TRAINING PROGRAM

## 2025-01-07 PROCEDURE — 1160F RVW MEDS BY RX/DR IN RCRD: CPT | Performed by: STUDENT IN AN ORGANIZED HEALTH CARE EDUCATION/TRAINING PROGRAM

## 2025-01-07 PROCEDURE — 99214 OFFICE O/P EST MOD 30 MIN: CPT | Performed by: STUDENT IN AN ORGANIZED HEALTH CARE EDUCATION/TRAINING PROGRAM

## 2025-01-07 PROCEDURE — 1036F TOBACCO NON-USER: CPT | Performed by: STUDENT IN AN ORGANIZED HEALTH CARE EDUCATION/TRAINING PROGRAM

## 2025-01-07 PROCEDURE — 3074F SYST BP LT 130 MM HG: CPT | Performed by: STUDENT IN AN ORGANIZED HEALTH CARE EDUCATION/TRAINING PROGRAM

## 2025-01-07 ASSESSMENT — COLUMBIA-SUICIDE SEVERITY RATING SCALE - C-SSRS
1. IN THE PAST MONTH, HAVE YOU WISHED YOU WERE DEAD OR WISHED YOU COULD GO TO SLEEP AND NOT WAKE UP?: NO
2. HAVE YOU ACTUALLY HAD ANY THOUGHTS OF KILLING YOURSELF?: NO
6. HAVE YOU EVER DONE ANYTHING, STARTED TO DO ANYTHING, OR PREPARED TO DO ANYTHING TO END YOUR LIFE?: NO

## 2025-01-07 NOTE — PROGRESS NOTES
Subjective   Patient ID: Matty Morrow is a 74 y.o. male who presents for 6 Month Post-Transplant Eval.    HPI   Routine fu.    Med refill.    He feels well in general.    He has an enlargement of his left arm just distal to the elbow, present for years. Non-tender. He does not think it is enlarging.   Review of Systems  12-point ROS reviewed and was negative unless otherwise noted in HPI.    Objective   /58 (BP Location: Right arm, Patient Position: Sitting, BP Cuff Size: Adult)   Pulse 82   Wt 55.3 kg (122 lb)   SpO2 99%   BMI 20.30 kg/m²     Physical Exam  GEN: conversant, NAD  HEENT: PERRL, EOMI, MMM  NECK: supple, no carotid bruits appreciated b/l  CV: S1, S2, RRR  PULM: CTAB  ABD: soft, NT, ND  NEURO: no new gross focal deficits  EXT: no sig LE edema  PSYCH: appropriate affect    Assessment/Plan     #Left arm mass: unclear etiology, present for years, non-tender, not rapidly enlarging. We will attempt to obtain an US for further eval.    #Squamous cell carcinoma: of right ear, seeing dermatology     #L DVT, likely provoked: Patient found to be heterozygous for factor V Leiden mutation and heterozygous prothrombin gene mutation. Lifelong anticoagulation has been recommended by hematology, patient is in agreement with this. Continue Eliquis.     #HTN: stable. Continue lisinopril 5 mg daily.     #Pre-diabetes: Advised dietary modification, continue exercise.     #Hyperlipidemia- Elevated LDL, but calcium score of 0. No current indication for statin.     #Elevated PSA: Follows regularly with [facility]. Multiple prostate biopsies have not shown evidence of cancer. Monitoring with surveillance.     #Health maintenance: Pneumovax given in 2016. Prevnar 13 received. TDaP given 2016. Received Shingrix. Normal colonoscopy 2019, no repeat advised. Longitudinal care. Labs before next visit.        RTC 6 mo

## 2025-01-30 ENCOUNTER — HOSPITAL ENCOUNTER (OUTPATIENT)
Dept: RADIOLOGY | Facility: HOSPITAL | Age: 75
Discharge: HOME | End: 2025-01-30
Payer: MEDICARE

## 2025-01-30 DIAGNOSIS — R22.32 ARM MASS, LEFT: ICD-10-CM

## 2025-01-30 PROCEDURE — 76881 US COMPL JOINT R-T W/IMG: CPT

## 2025-02-05 DIAGNOSIS — R22.32 ARM MASS, LEFT: Primary | ICD-10-CM

## 2025-02-05 NOTE — PROGRESS NOTES
Arm mass likely a lipoma on US. Given size, referral to orthopedic oncologist was advised, this was ordered. Findings and plan discussed with patient.

## 2025-02-25 ENCOUNTER — HOSPITAL ENCOUNTER (OUTPATIENT)
Dept: RADIOLOGY | Facility: EXTERNAL LOCATION | Age: 75
Discharge: HOME | End: 2025-02-25

## 2025-02-25 ENCOUNTER — APPOINTMENT (OUTPATIENT)
Dept: ORTHOPEDIC SURGERY | Age: 75
End: 2025-02-25
Payer: MEDICARE

## 2025-02-25 VITALS — BODY MASS INDEX: 20.83 KG/M2 | WEIGHT: 125 LBS | HEIGHT: 65 IN

## 2025-02-25 DIAGNOSIS — R22.32 MASS OF LEFT UPPER EXTREMITY: ICD-10-CM

## 2025-02-25 DIAGNOSIS — R22.32 ARM MASS, LEFT: ICD-10-CM

## 2025-02-25 DIAGNOSIS — R22.32 MASS OF LEFT UPPER EXTREMITY: Primary | ICD-10-CM

## 2025-02-25 PROCEDURE — 3008F BODY MASS INDEX DOCD: CPT | Performed by: STUDENT IN AN ORGANIZED HEALTH CARE EDUCATION/TRAINING PROGRAM

## 2025-02-25 PROCEDURE — 99204 OFFICE O/P NEW MOD 45 MIN: CPT | Performed by: STUDENT IN AN ORGANIZED HEALTH CARE EDUCATION/TRAINING PROGRAM

## 2025-02-25 PROCEDURE — 1159F MED LIST DOCD IN RCRD: CPT | Performed by: STUDENT IN AN ORGANIZED HEALTH CARE EDUCATION/TRAINING PROGRAM

## 2025-02-25 PROCEDURE — G2211 COMPLEX E/M VISIT ADD ON: HCPCS | Performed by: STUDENT IN AN ORGANIZED HEALTH CARE EDUCATION/TRAINING PROGRAM

## 2025-02-25 PROCEDURE — 1160F RVW MEDS BY RX/DR IN RCRD: CPT | Performed by: STUDENT IN AN ORGANIZED HEALTH CARE EDUCATION/TRAINING PROGRAM

## 2025-02-25 PROCEDURE — 1036F TOBACCO NON-USER: CPT | Performed by: STUDENT IN AN ORGANIZED HEALTH CARE EDUCATION/TRAINING PROGRAM

## 2025-02-25 ASSESSMENT — ENCOUNTER SYMPTOMS
OCCASIONAL FEELINGS OF UNSTEADINESS: 0
LOSS OF SENSATION IN FEET: 0
DEPRESSION: 0

## 2025-02-25 ASSESSMENT — PATIENT HEALTH QUESTIONNAIRE - PHQ9
2. FEELING DOWN, DEPRESSED OR HOPELESS: NOT AT ALL
SUM OF ALL RESPONSES TO PHQ9 QUESTIONS 1 AND 2: 0
1. LITTLE INTEREST OR PLEASURE IN DOING THINGS: NOT AT ALL

## 2025-02-25 ASSESSMENT — PAIN - FUNCTIONAL ASSESSMENT: PAIN_FUNCTIONAL_ASSESSMENT: NO/DENIES PAIN

## 2025-02-25 NOTE — PROGRESS NOTES
Orthopaedic Surgery  New Patient Clinic Note    Sanjay Morrow [id number] February 25, 2025    Reason for Consult: Left arm mass    HPI: This is a very pleasant and healthy 74-year-old male with a left forearm mass.  He states that it has been there for at least 5 years but probably even longer.  He does note that it seems to be growing although it has been very slow and he can only really tell when he compares his arm to previous old photographs.  Denies any trauma to the region.  Denies numbness or tingling.  He is left-hand-dominant and has no issues with function of that left arm.  Here to discuss options moving forward.  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    ROS:  15 point review of systems collected per intake sheet and negative except for as noted in HPI.    PMH:   Past Medical History:   Diagnosis Date    Squamous cell skin cancer     No history of DVT.     PSH:    Past Surgical History:   Procedure Laterality Date    CATARACT EXTRACTION  01/25/2016    Cataract Surgery    SKIN BIOPSY          SHx: No smoking. No IVDU    Meds:   Current Outpatient Medications on File Prior to Visit   Medication Sig Dispense Refill    apixaban (Eliquis) 5 mg tablet Take 1 tablet (5 mg) by mouth every 12 hours. 180 tablet 1    lisinopril 5 mg tablet TAKE 1 TABLET BY MOUTH EVERY DAY 90 tablet 0    multivitamin tablet Take 1 tablet by mouth once daily.       No current facility-administered medications on file prior to visit.       PHYSICAL EXAM    GEN: AaOx4, NAD  HEENT: normocephalic atraumatic, EOMI, MMM, pupils equal and round  PSYCH: appropriate mood and affect  RESP: nonlabored breathing   CARDIAC: Extremities WWP, RRR to peripheral palpation  NEURO: CN 2-12 grossly intact  SKIN: Atraumatic    Physical exam of the left upper extremity shows a large palpable mass over the left proximal forearm near the extensor wad.  It does wrap around volarly  slightly.  Sensation is intact light touch in all distributions.  He has palpable pulses and brisk cap refill distally.  AIN, PIN, ulnar nerves are intact.  No significant tenderness to palpation.  Mass is soft and mobile.    Imaging:    XR of the left forearm, obtained and personally reviewed today, shows large mass noted which appears deep to the fascia and has soft tissue characteristics consistent with likely a lipomatous neoplasm.  No osseous abnormalities noted.      Assessment:    74-year-old male with likely left arm lipomatous lesion    Plan:    We discussed that although the clinical as well as radiographic evidence at this point does point to likely a benign lipomatous lesion the MRI would be needed to confirm this.  For that reason we did discuss that we will order an MRI with and without contrast for better evaluation of both the tumor as well as local adjacent anatomy.  He will return afterwards to discuss the imaging findings as well as options moving forward in regards to management of the mass.

## 2025-02-27 DIAGNOSIS — I10 ESSENTIAL (PRIMARY) HYPERTENSION: ICD-10-CM

## 2025-02-27 RX ORDER — LISINOPRIL 5 MG/1
5 TABLET ORAL DAILY
Qty: 90 TABLET | Refills: 1 | Status: SHIPPED | OUTPATIENT
Start: 2025-02-27

## 2025-03-10 ENCOUNTER — HOSPITAL ENCOUNTER (OUTPATIENT)
Dept: RADIOLOGY | Facility: EXTERNAL LOCATION | Age: 75
Discharge: HOME | End: 2025-03-10
Payer: MEDICARE

## 2025-03-11 ENCOUNTER — HOSPITAL ENCOUNTER (OUTPATIENT)
Dept: RADIOLOGY | Facility: HOSPITAL | Age: 75
Discharge: HOME | End: 2025-03-11
Payer: MEDICARE

## 2025-03-11 DIAGNOSIS — R22.32 MASS OF LEFT UPPER EXTREMITY: ICD-10-CM

## 2025-03-11 PROCEDURE — 2550000001 HC RX 255 CONTRASTS: Performed by: STUDENT IN AN ORGANIZED HEALTH CARE EDUCATION/TRAINING PROGRAM

## 2025-03-11 PROCEDURE — 73220 MRI UPPR EXTREMITY W/O&W/DYE: CPT | Mod: LEFT SIDE | Performed by: RADIOLOGY

## 2025-03-11 PROCEDURE — 73220 MRI UPPR EXTREMITY W/O&W/DYE: CPT | Mod: LT

## 2025-03-11 PROCEDURE — A9575 INJ GADOTERATE MEGLUMI 0.1ML: HCPCS | Performed by: STUDENT IN AN ORGANIZED HEALTH CARE EDUCATION/TRAINING PROGRAM

## 2025-03-11 RX ORDER — GADOTERATE MEGLUMINE 376.9 MG/ML
11 INJECTION INTRAVENOUS
Status: COMPLETED | OUTPATIENT
Start: 2025-03-11 | End: 2025-03-11

## 2025-03-11 RX ADMIN — GADOTERATE MEGLUMINE 11 ML: 376.9 INJECTION INTRAVENOUS at 07:51

## 2025-03-14 ENCOUNTER — TELEMEDICINE (OUTPATIENT)
Dept: ORTHOPEDIC SURGERY | Facility: HOSPITAL | Age: 75
End: 2025-03-14
Payer: MEDICARE

## 2025-03-14 VITALS — HEIGHT: 65 IN | BODY MASS INDEX: 20.83 KG/M2 | WEIGHT: 125 LBS

## 2025-03-14 DIAGNOSIS — D17.20 LIPOMA OF EXTREMITY: Primary | ICD-10-CM

## 2025-03-14 PROCEDURE — G2211 COMPLEX E/M VISIT ADD ON: HCPCS | Performed by: STUDENT IN AN ORGANIZED HEALTH CARE EDUCATION/TRAINING PROGRAM

## 2025-03-14 PROCEDURE — 99213 OFFICE O/P EST LOW 20 MIN: CPT | Performed by: STUDENT IN AN ORGANIZED HEALTH CARE EDUCATION/TRAINING PROGRAM

## 2025-03-14 PROCEDURE — 1160F RVW MEDS BY RX/DR IN RCRD: CPT | Performed by: STUDENT IN AN ORGANIZED HEALTH CARE EDUCATION/TRAINING PROGRAM

## 2025-03-14 PROCEDURE — 3008F BODY MASS INDEX DOCD: CPT | Performed by: STUDENT IN AN ORGANIZED HEALTH CARE EDUCATION/TRAINING PROGRAM

## 2025-03-14 PROCEDURE — 1036F TOBACCO NON-USER: CPT | Performed by: STUDENT IN AN ORGANIZED HEALTH CARE EDUCATION/TRAINING PROGRAM

## 2025-03-14 PROCEDURE — 1159F MED LIST DOCD IN RCRD: CPT | Performed by: STUDENT IN AN ORGANIZED HEALTH CARE EDUCATION/TRAINING PROGRAM

## 2025-03-14 ASSESSMENT — ENCOUNTER SYMPTOMS
DEPRESSION: 0
LOSS OF SENSATION IN FEET: 0
OCCASIONAL FEELINGS OF UNSTEADINESS: 0

## 2025-03-14 ASSESSMENT — PATIENT HEALTH QUESTIONNAIRE - PHQ9
1. LITTLE INTEREST OR PLEASURE IN DOING THINGS: NOT AT ALL
SUM OF ALL RESPONSES TO PHQ9 QUESTIONS 1 AND 2: 0
2. FEELING DOWN, DEPRESSED OR HOPELESS: NOT AT ALL

## 2025-03-14 ASSESSMENT — PAIN - FUNCTIONAL ASSESSMENT: PAIN_FUNCTIONAL_ASSESSMENT: NO/DENIES PAIN

## 2025-03-14 NOTE — PROGRESS NOTES
Orthopaedic Surgery Clinic Progress Note:    CC: Follow-up MRI results    S: 74 y.o. male with a left forearm mass.  He states that it has been there for at least 5 years but probably even longer.  He does note that it seems to be growing although it has been very slow and he can only really tell when he compares his arm to previous old photographs.  Denies any trauma to the region.  Denies numbness or tingling.  He is left-hand-dominant and has no issues with function of that left arm.  Here to discuss options moving forward.  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  Because of the size as well as his potential desire for surgical intervention we did recommend getting an MRI which she recently obtained.  He is here to go over those results as well as to discuss potential options for management.    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  No osseous abnormalities noted.  MRI with and without contrast demonstrates a homogenous appearing lesion within the muscle belly that appears to follow fat on all sequences.  There is no significant septal or nodular enhancement.  MRI characteristics would be consistent with most likely benign lipoma.    A/P:    Discussed the MRI findings with the patient as well as his wife.  We discussed possible options moving forward including surveillance with repeat MRI in 1 year given the deep nature of the lesion as well as his large size and potential for growth.  Alternatively we discussed surgical intervention for  removal of the mass.  Risks were discussed included but were not limited to risk of infection, blood loss, DVT and pulmonary embolism, failure of surgery need for future revisions, damage to local structures, complications resolve anesthesia, wound dehiscence, local recurrence/progression, change diagnosis final pathology, seroma as well as chronic pain.  Ultimately after discussing both these options he does think he wants to have the mass removed but wants to probably do it later in the year potentially at the end of summer.  He will call us with potential available dates to choose a time to treat for him.  He does take Eliquis at baseline and we did discuss he will have to hold that prior to surgery but can resume it the day afterwards.  He states that he wants to call his vascular surgeon to make sure that that would be okay.  Will plan for removal on a date that is convenient for him later this year.

## 2025-04-08 ENCOUNTER — APPOINTMENT (OUTPATIENT)
Dept: ORTHOPEDIC SURGERY | Age: 75
End: 2025-04-08
Payer: MEDICARE

## 2025-04-08 PROCEDURE — 1159F MED LIST DOCD IN RCRD: CPT | Performed by: STUDENT IN AN ORGANIZED HEALTH CARE EDUCATION/TRAINING PROGRAM

## 2025-04-08 PROCEDURE — 1160F RVW MEDS BY RX/DR IN RCRD: CPT | Performed by: STUDENT IN AN ORGANIZED HEALTH CARE EDUCATION/TRAINING PROGRAM

## 2025-04-08 PROCEDURE — 1036F TOBACCO NON-USER: CPT | Performed by: STUDENT IN AN ORGANIZED HEALTH CARE EDUCATION/TRAINING PROGRAM

## 2025-04-08 PROCEDURE — G2211 COMPLEX E/M VISIT ADD ON: HCPCS | Performed by: STUDENT IN AN ORGANIZED HEALTH CARE EDUCATION/TRAINING PROGRAM

## 2025-04-08 PROCEDURE — 99213 OFFICE O/P EST LOW 20 MIN: CPT | Performed by: STUDENT IN AN ORGANIZED HEALTH CARE EDUCATION/TRAINING PROGRAM

## 2025-04-08 NOTE — PROGRESS NOTES
[facility]th[facility] Surgery Clinic Progress Note:    CC: Surgery discussion     S: 74 y.o. male with a left forearm mass.  He states that it has been there for at least 5 years but probably even longer.  He does note that it seems to be growing although it has been very slow and he can only really tell when he compares his arm to previous old photographs.  Denies any trauma to the region.  Denies numbness or tingling.  He is left-hand-dominant and has no issues with function of that left arm.  Here to discuss options moving forward.  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  Because of the size as well as his potential desire for surgical intervention we did recommend getting an MRI which she recently obtained.  We discussed options at his last visit and ultimately he decided he wanted to move forward with surgical excision.  He then decided to talk with his vascular surgeon which I told him was a good idea and afterwards had additional questions about his blood thinner management, activity after surgery as well as overall timing and postoperative protocol.  He is here to discuss additional surgical questions before scheduling.    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  No osseous abnormalities noted.  MRI with and without contrast demonstrates a homogenous appearing lesion within the muscle belly that appears to follow fat on all sequences.  There is no significant septal or nodular enhancement.  MRI characteristics would be consistent with most likely  benign lipoma.    A/P:    We again discussed the options moving forward.  I told him that surgery is completely his choice and if he decides he does not want move forward then I would recommend an MRI in 1 year given the deep nature to monitor the mass.  He does state that he would like to have it removed and we discussed in detail how we can manage his Eliquis as well as postoperative protocols.  I told him stopping the blood thinner for a couple days prior to surgery and then resuming immediately the day after would be reasonable from my standpoint.  We also discussed typical postoperative protocols as well as activity restrictions, which he basically will have very few.  Risks were discussed included but were not limited to risk of infection, blood loss, DVT and pulmonary embolism, failure of surgery need for future revisions, damage to local structures, complications resolve anesthesia, wound dehiscence, local recurrence/progression, change diagnosis final pathology, seroma as well as chronic pain.  Ultimately he did decide he would like to have the mass removed and we will work with our scheduling team to find a time later in the year that works for him.  I told him certainly to call back if there is any additional questions or concerns in the future.

## 2025-04-15 ENCOUNTER — PREP FOR PROCEDURE (OUTPATIENT)
Dept: ORTHOPEDIC SURGERY | Facility: HOSPITAL | Age: 75
End: 2025-04-15
Payer: MEDICARE

## 2025-04-15 DIAGNOSIS — Z98.890 H/O MAJOR ORTHOPEDIC SURGERY: ICD-10-CM

## 2025-04-15 DIAGNOSIS — T14.90XA ORTHOPEDIC TRAUMA: ICD-10-CM

## 2025-04-15 DIAGNOSIS — D17.20 LIPOMA OF FOREARM: Primary | ICD-10-CM

## 2025-04-15 RX ORDER — CEFAZOLIN SODIUM 2 G/50ML
2 SOLUTION INTRAVENOUS ONCE
OUTPATIENT
Start: 2025-06-30 | End: 2025-06-30

## 2025-05-07 ENCOUNTER — TELEPHONE (OUTPATIENT)
Dept: ORTHOPEDIC SURGERY | Facility: HOSPITAL | Age: 75
End: 2025-05-07
Payer: MEDICARE

## 2025-05-07 NOTE — TELEPHONE ENCOUNTER
Wants to know if health insurance is covering his elective surgery on 6/30. He is leaving the country first part of June and trying to be proactive in finding out if covered. He may not be reachable while traveling. Please call or [employer] message.

## 2025-06-04 ENCOUNTER — TELEPHONE (OUTPATIENT)
Dept: ORTHOPEDIC SURGERY | Facility: HOSPITAL | Age: 75
End: 2025-06-04
Payer: MEDICARE

## 2025-06-04 NOTE — TELEPHONE ENCOUNTER
He is leaving for a trip out of the country tomorrow. Preadmission testing told him it is too soon to schedule. Nurses have not reviewed his chart yet. They did assure him he would have time when he returns on 6/17 to schedule his testing. FYI.

## 2025-06-28 DIAGNOSIS — Z86.718 H/O DEEP VENOUS THROMBOSIS: ICD-10-CM

## 2025-06-30 RX ORDER — APIXABAN 5 MG/1
5 TABLET, FILM COATED ORAL EVERY 12 HOURS
Qty: 180 TABLET | Refills: 0 | Status: SHIPPED | OUTPATIENT
Start: 2025-06-30

## 2025-07-03 ENCOUNTER — CLINICAL SUPPORT (OUTPATIENT)
Dept: PREADMISSION TESTING | Facility: HOSPITAL | Age: 75
End: 2025-07-03
Payer: MEDICARE

## 2025-07-03 NOTE — CPM/PAT NURSE NOTE
"CPM/PAT Nurse Note      Name: Sanjay Morrow (Sanjay Morrow \"Matty\")  /Age: 1950/74 y.o.       Medical History[1]    Surgical History[2]    Patient  reports being sexually active and has had partner(s) who are female. He reports using the following method of birth control/protection: None.    Family History[3]    Allergies[4]    Prior to Admission medications    Medication Sig Start Date End Date Taking? Authorizing Provider   Eliquis 5 mg tablet TAKE 1 TABLET BY MOUTH EVERY 12 HOURS. 25   Sanjay Mcmillan MD   lisinopril 5 mg tablet TAKE 1 TABLET BY MOUTH EVERY DAY 25   Sanjay Mcmillan MD   multivitamin tablet Take 1 tablet by mouth once daily.    Historical Provider, MD   apixaban (Eliquis) 5 mg tablet Take 1 tablet (5 mg) by mouth every 12 hours. 25  MD JENNIFER Quinn ROS     DASI Risk Score      Flowsheet Row Questionnaire Series Submission from 4/15/2025 in [facility] with Generic Provider Latoya   Can you take care of yourself (eat, dress, bathe, or use toilet)?  2.75  filed at 04/15/2025 1915   Can you walk indoors, such as around your house? 1.75  filed at 04/15/2025 1915   Can you walk a block or two on level ground?  2.75  filed at 04/15/2025 1915   Can you climb a flight of stairs or walk up a hill? 5.5  filed at 04/15/2025 1915   Can you run a short distance? 8  filed at 04/15/2025 1915   Can you do light work around the house like dusting or washing dishes? 2.7  filed at 04/15/2025 1915   Can you do moderate work around the house like vacuuming, sweeping floors or carrying groceries? 3.5  filed at 04/15/2025 1915   Can you do heavy work around the house like scrubbing floors or lifting and moving heavy furniture?  8  filed at 04/15/2025 1915   Can you do yard work like raking leaves, weeding or pushing a mower? 4.5  filed at 04/15/2025 1915   Can you have sexual relations? 5.25  filed at 04/15/2025 1915   Can you participate in moderate recreational activities " like golf, bowling, dancing, doubles tennis or throwing a baseball or football? 6  filed at 04/15/2025 1915   Can you participate in strenous sports like swimming, singles tennis, football, basketball, or skiing? 0  filed at 04/15/2025 1915   DASI SCORE 50.7  filed at 04/15/2025 1915   METS Score (Will be calculated only when all the questions are answered) 9  filed at 04/15/2025 1915          Caprini DVT Assessment    No data to display       Modified Frailty Index    No data to display       [id number] Stroke Risk Points  Current as of just now        N/A 0 to 9 Points:      Last Change: N/A          The [id number] risk score (Lip ESTEFANIA, et al. 2009. © 2010 American College [employer]) quantifies the risk of stroke for a patient with atrial fibrillation. For patients without atrial fibrillation or under the age of 18 this score appears as N/A. Higher score values generally indicate higher risk of stroke.        This score is not applicable to this patient. Components are not calculated.          Revised Cardiac Risk Index    No data to display       Apfel Simplified Score    No data to display       Risk Analysis Index Results This Encounter    No data found in the last 10 encounters.       Stop Bang Score      Flowsheet Row Questionnaire Series Submission from 4/15/2025 in [facility] with Generic Provider Latoya   Do you snore loudly? 0  filed at 04/15/2025 1915   Do you often feel tired or fatigued after your sleep? 0  filed at 04/15/2025 1915   Has anyone ever observed you stop breathing in your sleep? 0  filed at 04/15/2025 1915   Do you have or are you being treated for high blood pressure? 1  filed at 04/15/2025 1915   Recent BMI (Calculated) 20.8  filed at 04/15/2025 1915   Is BMI greater than 35 kg/m2? 0=No  filed at 04/15/2025 1915   Age older than 50 years old? 1=Yes  filed at 04/15/2025 1915   Gender - Male 1=Yes  filed at 04/15/2025 1915          Prodigy: High Risk  Total Score: 20               Prodigy Age Score      Prodigy Gender Score          ARISCAT Score for Postoperative Pulmonary Complications    No data to display       Aj Perioperative Risk for Myocardial Infarction or Cardiac Arrest (LEXIS)    No data to display         Nurse Plan of Action: RN screening call complete.  Reviewed allergies, medications and pharmacy, medical, surgical and social history with patient.  Chart updated.  Instructed patient to stop multivitamin 7 days prior to surgery.                  [1]   Past Medical History:  Diagnosis Date    Anticoagulated on Eliquis     BPH (benign prostatic hyperplasia)     DVT (deep venous thrombosis) (Multi)     Vascular Surgery  [facility],   /9/24    Heterozygous factor V Leiden mutation (Multi)     Hyperlipidemia     Hypertension     Lipoma of forearm     Plan: Left Forearm Excision 7/21/25    Prediabetes     6/20/24 A1C= 5.8    Squamous cell skin cancer    [2]   Past Surgical History:  Procedure Laterality Date    CATARACT EXTRACTION Bilateral     COLONOSCOPY      SKIN BIOPSY     [3]   Family History  Problem Relation Name Age of Onset    Hypertension Mother      Hydrocephalus Father     [4] No Known Allergies

## 2025-07-08 ENCOUNTER — APPOINTMENT (OUTPATIENT)
Dept: PRIMARY CARE | Facility: CLINIC | Age: 75
End: 2025-07-08
Payer: MEDICARE

## 2025-07-09 ENCOUNTER — PRE-ADMISSION TESTING (OUTPATIENT)
Dept: PREADMISSION TESTING | Facility: HOSPITAL | Age: 75
End: 2025-07-09
Payer: MEDICARE

## 2025-07-09 ENCOUNTER — LAB (OUTPATIENT)
Dept: LAB | Facility: HOSPITAL | Age: 75
End: 2025-07-09
Payer: MEDICARE

## 2025-07-09 ENCOUNTER — TELEPHONE (OUTPATIENT)
Dept: ORTHOPEDIC SURGERY | Facility: HOSPITAL | Age: 75
End: 2025-07-09

## 2025-07-09 VITALS
TEMPERATURE: 98.8 F | WEIGHT: 112.43 LBS | DIASTOLIC BLOOD PRESSURE: 83 MMHG | SYSTOLIC BLOOD PRESSURE: 148 MMHG | OXYGEN SATURATION: 100 % | RESPIRATION RATE: 18 BRPM | HEIGHT: 64 IN | HEART RATE: 58 BPM | BODY MASS INDEX: 19.2 KG/M2

## 2025-07-09 DIAGNOSIS — Z01.818 PREOP TESTING: Primary | ICD-10-CM

## 2025-07-09 DIAGNOSIS — Z01.818 ENCOUNTER FOR OTHER PREPROCEDURAL EXAMINATION: Primary | ICD-10-CM

## 2025-07-09 DIAGNOSIS — R06.02 SOB (SHORTNESS OF BREATH): ICD-10-CM

## 2025-07-09 DIAGNOSIS — R06.02 SHORTNESS OF BREATH: ICD-10-CM

## 2025-07-09 LAB
ANION GAP SERPL CALC-SCNC: 15 MMOL/L (ref 10–20)
APTT PPP: 35 SECONDS (ref 26–36)
BASOPHILS # BLD AUTO: 0.03 X10*3/UL (ref 0–0.1)
BASOPHILS NFR BLD AUTO: 0.9 %
BUN SERPL-MCNC: 14 MG/DL (ref 6–23)
CALCIUM SERPL-MCNC: 9.1 MG/DL (ref 8.6–10.3)
CHLORIDE SERPL-SCNC: 103 MMOL/L (ref 98–107)
CO2 SERPL-SCNC: 25 MMOL/L (ref 21–32)
CREAT SERPL-MCNC: 0.84 MG/DL (ref 0.5–1.3)
EGFRCR SERPLBLD CKD-EPI 2021: >90 ML/MIN/1.73M*2
EOSINOPHIL # BLD AUTO: 0.13 X10*3/UL (ref 0–0.4)
EOSINOPHIL NFR BLD AUTO: 3.8 %
ERYTHROCYTE [DISTWIDTH] IN BLOOD BY AUTOMATED COUNT: 13.8 % (ref 11.5–14.5)
GLUCOSE SERPL-MCNC: 81 MG/DL (ref 74–99)
HCT VFR BLD AUTO: 43.3 % (ref 41–52)
HGB BLD-MCNC: 13.9 G/DL (ref 13.5–17.5)
IMM GRANULOCYTES # BLD AUTO: 0.01 X10*3/UL (ref 0–0.5)
IMM GRANULOCYTES NFR BLD AUTO: 0.3 % (ref 0–0.9)
INR PPP: 1.5 (ref 0.9–1.1)
LYMPHOCYTES # BLD AUTO: 1.09 X10*3/UL (ref 0.8–3)
LYMPHOCYTES NFR BLD AUTO: 32.2 %
MCH RBC QN AUTO: 25.8 PG (ref 26–34)
MCHC RBC AUTO-ENTMCNC: 32.1 G/DL (ref 32–36)
MCV RBC AUTO: 81 FL (ref 80–100)
MONOCYTES # BLD AUTO: 0.34 X10*3/UL (ref 0.05–0.8)
MONOCYTES NFR BLD AUTO: 10.1 %
NEUTROPHILS # BLD AUTO: 1.78 X10*3/UL (ref 1.6–5.5)
NEUTROPHILS NFR BLD AUTO: 52.7 %
NRBC BLD-RTO: 0 /100 WBCS (ref 0–0)
PLATELET # BLD AUTO: 200 X10*3/UL (ref 150–450)
POTASSIUM SERPL-SCNC: 4.5 MMOL/L (ref 3.5–5.3)
PROTHROMBIN TIME: 16.2 SECONDS (ref 9.8–12.4)
RBC # BLD AUTO: 5.38 X10*6/UL (ref 4.5–5.9)
SODIUM SERPL-SCNC: 138 MMOL/L (ref 136–145)
WBC # BLD AUTO: 3.4 X10*3/UL (ref 4.4–11.3)

## 2025-07-09 PROCEDURE — 80048 BASIC METABOLIC PNL TOTAL CA: CPT

## 2025-07-09 PROCEDURE — 36415 COLL VENOUS BLD VENIPUNCTURE: CPT

## 2025-07-09 PROCEDURE — 85025 COMPLETE CBC W/AUTO DIFF WBC: CPT

## 2025-07-09 PROCEDURE — 85730 THROMBOPLASTIN TIME PARTIAL: CPT

## 2025-07-09 PROCEDURE — 85610 PROTHROMBIN TIME: CPT

## 2025-07-09 PROCEDURE — 93010 ELECTROCARDIOGRAM REPORT: CPT | Performed by: INTERNAL MEDICINE

## 2025-07-09 ASSESSMENT — ENCOUNTER SYMPTOMS
HEMATOLOGIC/LYMPHATIC NEGATIVE: 1
PSYCHIATRIC NEGATIVE: 1
EYES NEGATIVE: 1
ENDOCRINE NEGATIVE: 1
NEUROLOGICAL NEGATIVE: 1
GASTROINTESTINAL NEGATIVE: 1
MUSCULOSKELETAL NEGATIVE: 1
CONSTITUTIONAL NEGATIVE: 1
ALLERGIC/IMMUNOLOGIC NEGATIVE: 1
RESPIRATORY NEGATIVE: 1
CARDIOVASCULAR NEGATIVE: 1

## 2025-07-09 NOTE — PREPROCEDURE INSTRUCTIONS
Medication List            Accurate as of July 9, 2025  9:19 AM. Always use your most recent med list.                Eliquis 5 mg tablet  Generic drug: apixaban  TAKE 1 TABLET BY MOUTH EVERY 12 HOURS.  Notes to patient: HOLD 2 Days prior to surgery - Last dose 7/18     lisinopril 5 mg tablet  TAKE 1 TABLET BY MOUTH EVERY DAY  Medication Adjustments for Surgery: Take last dose 1 day (24 hours) before surgery     multivitamin tablet  Notes to patient: HOLD 7 Days prior to surgery - Last dose 7/13                     Preoperative Brain Exercises    What are brain exercises?  A brain exercise is any activity that engages your thinking (cognitive) skills.    What types of activities are considered brain exercises?  Jigsaw puzzles, crossword puzzles, word jumble, memory games, word search, and many more.  Many can be found free online or on your phone via a mobile osmany.    Why should I do brain exercises before my surgery?  More recent research has shown brain exercise before surgery can lower the risk of postoperative delirium (confusion) which can be especially important for older adults.  Patients who did brain exercises for 5 to 10 hours (total) for the 7-10 days before surgery, cut their risk of postoperative delirium in half up to 1 week after surgery.    Concerning above medication instructions - If medication is normally taken at night continue normal schedule - do not take night prior and morning of surgery.       Preoperative Deep Breathing Exercises  Why it is important to do deep breathing exercises before my surgery?  Deep breathing exercises strengthen your breathing muscles.  This helps you to recover after your surgery and decreases the chance of breathing complications.  How are the deep breathing exercises done?  Sit straight with your back supported.  Breathe in deeply and slowly through your nose. Your lower rib cage should expand and your abdomen may move forward.  Hold that breath for 3 to 5  seconds.  Breathe out through pursed lips, slowly and completely.  Rest and repeat 10 times every hour while awake.  Rest longer if you become dizzy or lightheaded.      CONTACT SURGEON'S OFFICE IF YOU DEVELOP:  * Fever = 100.4 F   * New respiratory symptoms (e.g. cough, shortness of breath, respiratory distress, sore throat)  * Recent loss of taste or smell  *Flu like symptoms such as headache, fatigue or gastrointestinal symptoms  * You develop any open sores, shingles, burning or painful urination   AND/OR:  * You no longer wish to have the surgery.  * Any other personal circumstances change that may lead to the need to cancel or defer this surgery.  *You were admitted to any hospital within one week of your planned procedure.    SMOKING:  *Quitting smoking can make a huge difference to your health and recovery from surgery.    *If you need help with quitting, call 1-937-QUIT-NOW.    THE DAY OF SURGERY:  *Do not eat any food after midnight the night before surgery/procedure.   *YOU MUST DRINK 14 oz drink clear liquids (i.e. water, black coffee/tea, (no milk or cream) apple juice, and electrolyte drinks (Gatorade)  2 hours before your instructed arrival time to the hospital.  *You may chew gum until  2 hours before your surgery/procedure.    SURGICAL TIME  *You will be contacted between 2 p.m. and 6 p.m. the business day before your surgery with your arrival time.  *If you haven't received a call by 6pm, call [phone].  *Scheduled surgery times may change and you will be notified if this occurs-check your personal voicemail for any updates.    ON THE MORNING OF SURGERY:  *Wear comfortable, loose fitting clothing.   *Do not use moisturizers, creams, lotions or perfume.  *All jewelry and valuables should be left at home.  *Prosthetic devices such as contact lenses, hearing aids, dentures, eyelash extensions, hairpins and body piercing must be removed before surgery.    BRING WITH YOU:  *Photo ID and insurance  card  *Current list of medicines and allergies  *Pacemaker/Defibrillator/Heart stent cards  *CPAP machine and mask  *Slings/splints/crutches  *Copy of your complete Advanced Directive/DHPOA-if applicable  *Neurostimulator implant remote    PARKING AND ARRIVAL:  *Check in at the Main Entrance desk and let them know you are here for surgery.  *You will be directed to the 2nd floor surgical waiting area.    AFTER OUTPATIENT SURGERY:  *A responsible adult MUST accompany you at the time of discharge and stay with you for 24 hours after your surgery.  *You may NOT drive yourself home after surgery.  *You may use a taxi or ride sharing service ([employer], [employer]) to return home ONLY if you are accompanied by a friend or family member.  *Instructions for resuming your medications will be provided by your surgeon.

## 2025-07-09 NOTE — TELEPHONE ENCOUNTER
Pre-admission testing is complete; would like to know if additional information has been found on his health insurance coverage for upcoming elective surgery

## 2025-07-09 NOTE — CPM/PAT H&P
"[facility]/PAT Evaluation       Name: Sanjay Morrow (Sanjay Morrow \"Matty\")  /Age: 1950/74 y.o.     In-Person       Chief Complaint: Lipoma of forearm [D17.20]     HPI      Date of Consult: 25    Referring Provider: Dr. Miller     Surgery, Date, and Length: Left Forearm Excision; 25; 65 minutes     Sanjay Morrow  is a 74 year-old male who presents to the [facility] for perioperative risk assessment prior to surgery. Presents   with a left forearm mass. He states that it has been there for at least 5 years but probably even longer. He does note that it seems to be growing although it has been very slow and he can only really tell when he compares his arm to previous old photographs. Denies any trauma to the region. Denies numbness or tingling. He is left-hand-dominant and has no issues with function of that left arm.   MRI 3/11/25: Large intramuscular fat containing lesion in the proximal forearm  with a small area of internal vasculature. This is compatible with a simple lipoma.        This note was created in part upon personal review of patient's medical records.      Patient is scheduled to have Left Forearm Excision     Medical History  Past Medical History:   Diagnosis Date    Anticoagulated on Eliquis     BPH (benign prostatic hyperplasia)     DVT (deep venous thrombosis) (Multi)     LLE    Heterozygous factor V Leiden mutation (Multi)     Hyperlipidemia     Hypertension     Lipoma of forearm     Plan: Left Forearm Excision 25    Prediabetes     24 A1C= 5.8    Squamous cell skin cancer             Surgical History  Past Surgical History:   Procedure Laterality Date    CATARACT EXTRACTION Bilateral     COLONOSCOPY      SKIN BIOPSY               Family history:  Family History   Problem Relation Name Age of Onset    Hypertension Mother      Hydrocephalus Father          Social history:  Social History     Socioeconomic History    Marital status:      Spouse name: Not on file    " "Number of children: Not on file    Years of education: Not on file    Highest education level: Not on file   Occupational History    Not on file   Tobacco Use    Smoking status: Never    Smokeless tobacco: Never   Vaping Use    Vaping status: Never Used   Substance and Sexual Activity    Alcohol use: Yes     Alcohol/week: 1.0 - 2.0 standard drink of alcohol     Types: 1 - 2 Glasses of wine per week    Drug use: Never    Sexual activity: Yes     Partners: Female     Birth control/protection: None     Comment: Sex only with my wife once a week or so.   Other Topics Concern    Not on file   Social History Narrative    Not on file     Social Drivers of Health     Financial Resource Strain: Not on file   Food Insecurity: Not on file   Transportation Needs: Not on file   Physical Activity: Not on file   Stress: Not on file   Social Connections: Not on file   Intimate Partner Violence: Not on file   Housing Stability: Not on file        Current Outpatient Medications   Medication Instructions    Eliquis 5 mg, oral, Every 12 hours    lisinopril 5 mg, oral, Daily    multivitamin tablet 1 tablet, Daily       Visit Vitals  /83   Pulse 58   Temp 37.1 °C (98.8 °F) (Temporal)   Resp 18   Ht 1.626 m (5' 4\")   Wt 51 kg (112 lb 7 oz)   SpO2 100%   BMI 19.30 kg/m²   Smoking Status Never   BSA 1.52 m²             Review of Systems   Constitutional: Negative.    HENT: Negative.     Eyes: Negative.         Glasses   Respiratory: Negative.     Cardiovascular: Negative.         METS 4  stairs / walk in from lot / yard work Without chest pain / SOB    Gastrointestinal: Negative.    Endocrine: Negative.    Genitourinary: Negative.    Musculoskeletal: Negative.    Skin: Negative.    Allergic/Immunologic: Negative.    Neurological: Negative.    Hematological: Negative.    Psychiatric/Behavioral: Negative.          Physical Exam  Constitutional:       Appearance: Normal appearance.   HENT:      Head: Normocephalic and atraumatic.      " Right Ear: External ear normal.      Left Ear: External ear normal.      Nose: Nose normal.      Mouth/Throat:      Pharynx: Oropharynx is clear.   Eyes:      Conjunctiva/sclera: Conjunctivae normal.   Cardiovascular:      Rate and Rhythm: Normal rate and regular rhythm.      Heart sounds: Normal heart sounds.   Pulmonary:      Effort: Pulmonary effort is normal.      Breath sounds: Normal breath sounds.   Abdominal:      General: Abdomen is flat.      Palpations: Abdomen is soft.   Musculoskeletal:         General: Normal range of motion.      Cervical back: Normal range of motion and neck supple.      Comments: Non tender mass left forearm    Skin:     General: Skin is warm and dry.   Neurological:      General: No focal deficit present.      Mental Status: He is alert and oriented to person, place, and time.   Psychiatric:         Mood and Affect: Mood normal.         Behavior: Behavior normal.         Thought Content: Thought content normal.         Judgment: Judgment normal.          PAT AIRWAY:   Airway:     Mallampati::  I    Neck ROM::  Full      Lab Results   Component Value Date    WBC 3.4 (L) 07/09/2025    HGB 13.9 07/09/2025    HCT 43.3 07/09/2025    MCV 81 07/09/2025     07/09/2025      Lab Results   Component Value Date    GLUCOSE 81 07/09/2025    CALCIUM 9.1 07/09/2025     07/09/2025    K 4.5 07/09/2025    CO2 25 07/09/2025     07/09/2025    BUN 14 07/09/2025    CREATININE 0.84 07/09/2025      Component  Ref Range & Units 10:06  (7/9/25) 4 yr ago  (8/2/20) 4 yr ago  (8/2/20)   Protime  9.8 - 12.4 seconds 16.2 High  12.5 R, CM    INR  0.9 - 1.1 1.5 High  1.1    aPTT  26 - 36 seconds 35  28 R, CM         EKG 7/9/25  NSR  Normal  62 BPM '    CT calcium score 5/6/20  LM: 0.  LAD: 0.  LCx: 0.  RCA: 0.     Total: 0.    Patient is scheduled to have Left Forearm Excision     Cardiovascular  METS: 4   RCRI: 0  , 3.9 % Risk of MACE  Caprini: 7    HTN- lisinopril HOLD 24 hours prior to surgery      Pulmonary:  Stop Bang score is 3 placing patient at moderate risk for HU  ARISCAT: <26 points, 1.6% risk of in-hospital postoperative pulmonary complication  PRODIGY: High risk for opioid induced respiratory depression       Neurology    No neurologic diagnosis, however, the patient is at increased risk for perioperative delirium secondary to  age, polypharmacy, Patient instructed on and provided cognitive exercises  Patient is at increased risk for perioperative CVA secondary to  perioperative interruption of antithrombotic, HTN, increased age, hypercoagulable state     Hematology       Patient instructed to ambulate as soon as possible postoperatively to decrease thromboembolic risk.      Initiate mechanical DVT prophylaxis as soon as possible and initiate chemical prophylaxis when deemed safe from a bleeding standpoint post surgery.       VTE prophylaxis per surgical team       Risk assessment Dr. Sanjay Mcmillan :      Factor V Leiden- eliquis HOLD 2 Days prior to surgery         Tests ordered in PAT: cbc, bmp, coag, EKG   LABS REVIEWED: unremarkable     Risk assessment complete.  Patient is scheduled for a low surgical risk procedure.        Preoperative medication instructions were provided and reviewed with the patient.  Any additional testing or evaluation was explained to the patient.  Nothing by mouth instructions were discussed and patient's questions were answered prior to conclusion to this encounter.  Patient verbalized understanding of preoperative instructions given in preadmission testing; discharge instructions available in EMR.

## 2025-07-10 ENCOUNTER — HOSPITAL ENCOUNTER (OUTPATIENT)
Dept: CARDIOLOGY | Facility: HOSPITAL | Age: 75
Discharge: HOME | End: 2025-07-10
Payer: MEDICARE

## 2025-07-10 DIAGNOSIS — E78.2 MIXED HYPERLIPIDEMIA: Primary | ICD-10-CM

## 2025-07-10 DIAGNOSIS — R73.03 PRE-DIABETES: ICD-10-CM

## 2025-07-10 LAB
ATRIAL RATE: 62 BPM
P AXIS: 76 DEGREES
P OFFSET: 191 MS
P ONSET: 141 MS
PR INTERVAL: 170 MS
Q ONSET: 226 MS
QRS COUNT: 11 BEATS
QRS DURATION: 82 MS
QT INTERVAL: 364 MS
QTC CALCULATION(BAZETT): 369 MS
QTC FREDERICIA: 368 MS
R AXIS: 51 DEGREES
T AXIS: 70 DEGREES
T OFFSET: 408 MS
VENTRICULAR RATE: 62 BPM

## 2025-07-10 PROCEDURE — 93005 ELECTROCARDIOGRAM TRACING: CPT

## 2025-07-11 ENCOUNTER — DOCUMENTATION (OUTPATIENT)
Dept: PREADMISSION TESTING | Facility: HOSPITAL | Age: 75
End: 2025-07-11
Payer: MEDICARE

## 2025-07-11 NOTE — CPM/PAT NURSE NOTE
"[facility]/JENNIFER Nurse Note      Name: Sanjay Morrow (Sanjay Morrow \"Matty\")  /Age: 1950/74 y.o.       Medical History[1]    Surgical History[2]    Patient  reports being sexually active and has had partner(s) who are female. He reports using the following method of birth control/protection: None.    Family History[3]    Allergies[4]    Prior to Admission medications    Medication Sig Start Date End Date Taking? Authorizing Provider   Eliquis 5 mg tablet TAKE 1 TABLET BY MOUTH EVERY 12 HOURS. 25   Sanjay Mcmillan MD   lisinopril 5 mg tablet TAKE 1 TABLET BY MOUTH EVERY DAY 25   Sanjay Mcmillan MD   multivitamin tablet Take 1 tablet by mouth once daily.    Historical Provider, MD JENNIFER BOBBY     DASI Risk Score      Flowsheet Row Questionnaire Series Submission from 4/15/2025 in [facility] with Generic Provider Latoya   Can you take care of yourself (eat, dress, bathe, or use toilet)?  2.75  filed at 04/15/2025 1915   Can you walk indoors, such as around your house? 1.75  filed at 04/15/2025 1915   Can you walk a block or two on level ground?  2.75  filed at 04/15/2025 1915   Can you climb a flight of stairs or walk up a hill? 5.5  filed at 04/15/2025 1915   Can you run a short distance? 8  filed at 04/15/2025 1915   Can you do light work around the house like dusting or washing dishes? 2.7  filed at 04/15/2025 1915   Can you do moderate work around the house like vacuuming, sweeping floors or carrying groceries? 3.5  filed at 04/15/2025 1915   Can you do heavy work around the house like scrubbing floors or lifting and moving heavy furniture?  8  filed at 04/15/2025 1915   Can you do yard work like raking leaves, weeding or pushing a mower? 4.5  filed at 04/15/2025 1915   Can you have sexual relations? 5.25  filed at 04/15/2025 1915   Can you participate in moderate recreational activities like golf, bowling, dancing, doubles tennis or throwing a baseball or football? 6  filed at 04/15/2025 191   Can " you participate in strenous sports like swimming, singles tennis, football, basketball, or skiing? 0  filed at 04/15/2025 1915   DASI SCORE 50.7  filed at 04/15/2025 1915   METS Score (Will be calculated only when all the questions are answered) 9  filed at 04/15/2025 1915          Caprini DVT Assessment    No data to display       Modified Frailty Index    No data to display       [id number] Stroke Risk Points  Current as of just now        N/A 0 to 9 Points:      Last Change: N/A          The [id number] risk score (Lip ESTEFANIA, et al. 2009. © 2010 [employer]) quantifies the risk of stroke for a patient with atrial fibrillation. For patients without atrial fibrillation or under the age of 18 this score appears as N/A. Higher score values generally indicate higher risk of stroke.        This score is not applicable to this patient. Components are not calculated.          Revised Cardiac Risk Index    No data to display       Apfel Simplified Score    No data to display       Risk Analysis Index Results This Encounter    No data found in the last 10 encounters.       Stop Bang Score      Flowsheet Row Pre-Admission Testing from 7/9/2025 in [facility] with Beth Browne PA-C Questionnaire Series Submission from 4/15/2025 in [facility] with Generic Provider Latoya   Do you snore loudly? 0 filed at 07/09/2025 0935 0  filed at 04/15/2025 1915   Do you often feel tired or fatigued after your sleep? 0 filed at 07/09/2025 0935 0  filed at 04/15/2025 1915   Has anyone ever observed you stop breathing in your sleep? 0 filed at 07/09/2025 0935 0  filed at 04/15/2025 1915   Do you have or are you being treated for high blood pressure? 1 filed at 07/09/2025 0935 1  filed at 04/15/2025 1915   Recent BMI (Calculated) 19.3 filed at 07/09/2025 0935 20.8  filed at 04/15/2025 1915   Is BMI greater than 35 kg/m2? 0=No filed at 07/09/2025 0935 0=No  filed at 04/15/2025 1915   Age older than  50 years old? 1=Yes filed at 07/09/2025 0935 1=Yes  filed at 04/15/2025 1915   Is your neck circumference greater than 17 inches (Male) or 16 inches (Female)? 0 filed at 07/09/2025 0935 --   Gender - Male 1=Yes filed at 07/09/2025 0935 1=Yes  filed at 04/15/2025 1915   STOP-BANG Total Score 3 filed at 07/09/2025 0935 --          Prodigy: High Risk  Total Score: 20              Prodigy Age Score      Prodigy Gender Score          ARISCAT Score for Postoperative Pulmonary Complications      Flowsheet Row [facility]-C   Age Calculated Score 3 filed at 07/09/2025 0935   Preoperative SpO2 0 filed at 07/09/2025 0935   Respiratory infection in the last month Either upper or lower (i.e., URI, bronchitis, pneumonia), with fever and antibiotic treatment 0 filed at 07/09/2025 0935   Preoperative anemia (Hgb less than 10 g/dl) 0 filed at 07/09/2025 0935   Surgical incision  0 filed at 07/09/2025 0935   Duration of surgery  0 filed at 07/09/2025 0935   Emergency Procedure  0 filed at 07/09/2025 0935   ARISCAT Total Score  3 filed at 07/09/2025 0935          Aj Perioperative Risk for Myocardial Infarction or Cardiac Arrest (LEXIS)    No data to display         Nurse Plan of Action: After Visit Summary (AVS) reviewed and patient verbalized good understanding of medications and NPO instructions.                   [1]   Past Medical History:  Diagnosis Date    Anticoagulated on Eliquis     BPH (benign prostatic hyperplasia)     DVT (deep venous thrombosis) (Multi) 2020    LLE    Heterozygous factor V Leiden mutation (Multi)     Hyperlipidemia     Hypertension     Lipoma of forearm     Plan: Left Forearm Excision 7/21/25    Prediabetes     6/20/24 A1C= 5.8    Squamous cell skin cancer    [2]   Past Surgical History:  Procedure Laterality Date    CATARACT EXTRACTION Bilateral     COLONOSCOPY      SKIN BIOPSY     [3]   Family History  Problem Relation Name  Age of Onset    Hypertension Mother      Hydrocephalus Father     [4] No Known Allergies

## 2025-07-17 LAB
CHOLEST SERPL-MCNC: 211 MG/DL
CHOLEST/HDLC SERPL: 2.6 (CALC)
EST. AVERAGE GLUCOSE BLD GHB EST-MCNC: 126 MG/DL
EST. AVERAGE GLUCOSE BLD GHB EST-SCNC: 7 MMOL/L
HBA1C MFR BLD: 6 %
HDLC SERPL-MCNC: 81 MG/DL
LDLC SERPL CALC-MCNC: 111 MG/DL (CALC)
NONHDLC SERPL-MCNC: 130 MG/DL (CALC)
TRIGL SERPL-MCNC: 91 MG/DL

## 2025-07-21 ENCOUNTER — PHARMACY VISIT (OUTPATIENT)
Dept: PHARMACY | Facility: CLINIC | Age: 75
End: 2025-07-21
Payer: COMMERCIAL

## 2025-07-21 ENCOUNTER — TELEPHONE (OUTPATIENT)
Dept: ORTHOPEDIC SURGERY | Facility: HOSPITAL | Age: 75
End: 2025-07-21

## 2025-07-21 ENCOUNTER — ANESTHESIA EVENT (OUTPATIENT)
Dept: OPERATING ROOM | Facility: HOSPITAL | Age: 75
End: 2025-07-21
Payer: MEDICARE

## 2025-07-21 ENCOUNTER — ANESTHESIA (OUTPATIENT)
Dept: OPERATING ROOM | Facility: HOSPITAL | Age: 75
End: 2025-07-21
Payer: MEDICARE

## 2025-07-21 ENCOUNTER — HOSPITAL ENCOUNTER (OUTPATIENT)
Facility: HOSPITAL | Age: 75
Setting detail: OUTPATIENT SURGERY
Discharge: HOME | End: 2025-07-21
Attending: STUDENT IN AN ORGANIZED HEALTH CARE EDUCATION/TRAINING PROGRAM | Admitting: STUDENT IN AN ORGANIZED HEALTH CARE EDUCATION/TRAINING PROGRAM
Payer: MEDICARE

## 2025-07-21 VITALS
RESPIRATION RATE: 14 BRPM | BODY MASS INDEX: 19.2 KG/M2 | SYSTOLIC BLOOD PRESSURE: 157 MMHG | OXYGEN SATURATION: 100 % | TEMPERATURE: 97.7 F | DIASTOLIC BLOOD PRESSURE: 82 MMHG | HEART RATE: 52 BPM | WEIGHT: 112.43 LBS | HEIGHT: 64 IN

## 2025-07-21 DIAGNOSIS — D17.20 LIPOMA OF FOREARM: Primary | ICD-10-CM

## 2025-07-21 PROCEDURE — 2500000005 HC RX 250 GENERAL PHARMACY W/O HCPCS: Performed by: STUDENT IN AN ORGANIZED HEALTH CARE EDUCATION/TRAINING PROGRAM

## 2025-07-21 PROCEDURE — 2500000004 HC RX 250 GENERAL PHARMACY W/ HCPCS (ALT 636 FOR OP/ED): Performed by: ANESTHESIOLOGIST ASSISTANT

## 2025-07-21 PROCEDURE — 88304 TISSUE EXAM BY PATHOLOGIST: CPT | Mod: TC,AHULAB | Performed by: STUDENT IN AN ORGANIZED HEALTH CARE EDUCATION/TRAINING PROGRAM

## 2025-07-21 PROCEDURE — 3700000002 HC GENERAL ANESTHESIA TIME - EACH INCREMENTAL 1 MINUTE: Performed by: STUDENT IN AN ORGANIZED HEALTH CARE EDUCATION/TRAINING PROGRAM

## 2025-07-21 PROCEDURE — 7100000010 HC PHASE TWO TIME - EACH INCREMENTAL 1 MINUTE: Performed by: STUDENT IN AN ORGANIZED HEALTH CARE EDUCATION/TRAINING PROGRAM

## 2025-07-21 PROCEDURE — 99100 ANES PT EXTEME AGE<1 YR&>70: CPT | Performed by: STUDENT IN AN ORGANIZED HEALTH CARE EDUCATION/TRAINING PROGRAM

## 2025-07-21 PROCEDURE — RXMED WILLOW AMBULATORY MEDICATION CHARGE

## 2025-07-21 PROCEDURE — 2500000005 HC RX 250 GENERAL PHARMACY W/O HCPCS: Performed by: ANESTHESIOLOGIST ASSISTANT

## 2025-07-21 PROCEDURE — A25073: Performed by: ANESTHESIOLOGIST ASSISTANT

## 2025-07-21 PROCEDURE — 3600000007 HC OR TIME - EACH INCREMENTAL 1 MINUTE - PROCEDURE LEVEL TWO: Performed by: STUDENT IN AN ORGANIZED HEALTH CARE EDUCATION/TRAINING PROGRAM

## 2025-07-21 PROCEDURE — 3700000001 HC GENERAL ANESTHESIA TIME - INITIAL BASE CHARGE: Performed by: STUDENT IN AN ORGANIZED HEALTH CARE EDUCATION/TRAINING PROGRAM

## 2025-07-21 PROCEDURE — A25073: Performed by: STUDENT IN AN ORGANIZED HEALTH CARE EDUCATION/TRAINING PROGRAM

## 2025-07-21 PROCEDURE — 2500000004 HC RX 250 GENERAL PHARMACY W/ HCPCS (ALT 636 FOR OP/ED): Performed by: STUDENT IN AN ORGANIZED HEALTH CARE EDUCATION/TRAINING PROGRAM

## 2025-07-21 PROCEDURE — 7100000009 HC PHASE TWO TIME - INITIAL BASE CHARGE: Performed by: STUDENT IN AN ORGANIZED HEALTH CARE EDUCATION/TRAINING PROGRAM

## 2025-07-21 PROCEDURE — 3600000002 HC OR TIME - INITIAL BASE CHARGE - PROCEDURE LEVEL TWO: Performed by: STUDENT IN AN ORGANIZED HEALTH CARE EDUCATION/TRAINING PROGRAM

## 2025-07-21 PROCEDURE — 7100000002 HC RECOVERY ROOM TIME - EACH INCREMENTAL 1 MINUTE: Performed by: STUDENT IN AN ORGANIZED HEALTH CARE EDUCATION/TRAINING PROGRAM

## 2025-07-21 PROCEDURE — 2720000007 HC OR 272 NO HCPCS: Performed by: STUDENT IN AN ORGANIZED HEALTH CARE EDUCATION/TRAINING PROGRAM

## 2025-07-21 PROCEDURE — 7100000001 HC RECOVERY ROOM TIME - INITIAL BASE CHARGE: Performed by: STUDENT IN AN ORGANIZED HEALTH CARE EDUCATION/TRAINING PROGRAM

## 2025-07-21 PROCEDURE — 25073 EXC FOREARM TUM DEEP 3 CM/>: CPT | Performed by: STUDENT IN AN ORGANIZED HEALTH CARE EDUCATION/TRAINING PROGRAM

## 2025-07-21 RX ORDER — OXYCODONE HYDROCHLORIDE 5 MG/1
5 TABLET ORAL EVERY 6 HOURS PRN
Qty: 28 TABLET | Refills: 0 | Status: SHIPPED | OUTPATIENT
Start: 2025-07-21 | End: 2025-07-28

## 2025-07-21 RX ORDER — CEFAZOLIN SODIUM 2 G/50ML
2 SOLUTION INTRAVENOUS ONCE
Status: DISCONTINUED | OUTPATIENT
Start: 2025-07-21 | End: 2025-07-21 | Stop reason: HOSPADM

## 2025-07-21 RX ORDER — DROPERIDOL 2.5 MG/ML
0.62 INJECTION, SOLUTION INTRAMUSCULAR; INTRAVENOUS ONCE AS NEEDED
Status: DISCONTINUED | OUTPATIENT
Start: 2025-07-21 | End: 2025-07-21 | Stop reason: HOSPADM

## 2025-07-21 RX ORDER — ONDANSETRON 4 MG/1
4 TABLET, FILM COATED ORAL EVERY 8 HOURS PRN
Qty: 21 TABLET | Refills: 0 | Status: SHIPPED | OUTPATIENT
Start: 2025-07-21 | End: 2025-07-28

## 2025-07-21 RX ORDER — ONDANSETRON HYDROCHLORIDE 2 MG/ML
INJECTION, SOLUTION INTRAVENOUS AS NEEDED
Status: DISCONTINUED | OUTPATIENT
Start: 2025-07-21 | End: 2025-07-21

## 2025-07-21 RX ORDER — SENNOSIDES 8.6 MG/1
1 TABLET ORAL DAILY PRN
Qty: 15 TABLET | Refills: 0 | Status: SHIPPED | OUTPATIENT
Start: 2025-07-21 | End: 2025-08-05

## 2025-07-21 RX ORDER — SODIUM CHLORIDE, SODIUM LACTATE, POTASSIUM CHLORIDE, CALCIUM CHLORIDE 600; 310; 30; 20 MG/100ML; MG/100ML; MG/100ML; MG/100ML
INJECTION, SOLUTION INTRAVENOUS CONTINUOUS PRN
Status: DISCONTINUED | OUTPATIENT
Start: 2025-07-21 | End: 2025-07-21

## 2025-07-21 RX ORDER — PROPOFOL 10 MG/ML
INJECTION, EMULSION INTRAVENOUS AS NEEDED
Status: DISCONTINUED | OUTPATIENT
Start: 2025-07-21 | End: 2025-07-21

## 2025-07-21 RX ORDER — KETOROLAC TROMETHAMINE 30 MG/ML
INJECTION, SOLUTION INTRAMUSCULAR; INTRAVENOUS AS NEEDED
Status: DISCONTINUED | OUTPATIENT
Start: 2025-07-21 | End: 2025-07-21

## 2025-07-21 RX ORDER — DOCUSATE SODIUM 100 MG/1
100 CAPSULE, LIQUID FILLED ORAL 2 TIMES DAILY
Qty: 30 CAPSULE | Refills: 0 | Status: SHIPPED | OUTPATIENT
Start: 2025-07-21 | End: 2025-08-05

## 2025-07-21 RX ORDER — ACETAMINOPHEN 500 MG
1000 TABLET ORAL EVERY 8 HOURS
Qty: 42 TABLET | Refills: 0 | Status: SHIPPED | OUTPATIENT
Start: 2025-07-21 | End: 2025-07-28

## 2025-07-21 RX ORDER — ONDANSETRON HYDROCHLORIDE 2 MG/ML
4 INJECTION, SOLUTION INTRAVENOUS ONCE AS NEEDED
Status: DISCONTINUED | OUTPATIENT
Start: 2025-07-21 | End: 2025-07-21 | Stop reason: HOSPADM

## 2025-07-21 RX ORDER — LABETALOL HYDROCHLORIDE 5 MG/ML
5 INJECTION, SOLUTION INTRAVENOUS ONCE AS NEEDED
Status: DISCONTINUED | OUTPATIENT
Start: 2025-07-21 | End: 2025-07-21 | Stop reason: HOSPADM

## 2025-07-21 RX ORDER — FENTANYL CITRATE 50 UG/ML
INJECTION, SOLUTION INTRAMUSCULAR; INTRAVENOUS AS NEEDED
Status: DISCONTINUED | OUTPATIENT
Start: 2025-07-21 | End: 2025-07-21

## 2025-07-21 RX ORDER — FENTANYL CITRATE 50 UG/ML
12.5 INJECTION, SOLUTION INTRAMUSCULAR; INTRAVENOUS EVERY 5 MIN PRN
Status: DISCONTINUED | OUTPATIENT
Start: 2025-07-21 | End: 2025-07-21 | Stop reason: HOSPADM

## 2025-07-21 RX ORDER — LIDOCAINE HYDROCHLORIDE 20 MG/ML
INJECTION, SOLUTION EPIDURAL; INFILTRATION; INTRACAUDAL; PERINEURAL AS NEEDED
Status: DISCONTINUED | OUTPATIENT
Start: 2025-07-21 | End: 2025-07-21

## 2025-07-21 RX ORDER — LIDOCAINE HYDROCHLORIDE 10 MG/ML
0.1 INJECTION, SOLUTION EPIDURAL; INFILTRATION; INTRACAUDAL; PERINEURAL ONCE
Status: DISCONTINUED | OUTPATIENT
Start: 2025-07-21 | End: 2025-07-21 | Stop reason: HOSPADM

## 2025-07-21 RX ORDER — ALBUTEROL SULFATE 0.83 MG/ML
2.5 SOLUTION RESPIRATORY (INHALATION) ONCE AS NEEDED
Status: DISCONTINUED | OUTPATIENT
Start: 2025-07-21 | End: 2025-07-21 | Stop reason: HOSPADM

## 2025-07-21 RX ORDER — MIDAZOLAM HYDROCHLORIDE 2 MG/2ML
INJECTION, SOLUTION INTRAMUSCULAR; INTRAVENOUS AS NEEDED
Status: DISCONTINUED | OUTPATIENT
Start: 2025-07-21 | End: 2025-07-21

## 2025-07-21 RX ORDER — BUPIVACAINE HYDROCHLORIDE 5 MG/ML
INJECTION, SOLUTION EPIDURAL; INTRACAUDAL; PERINEURAL AS NEEDED
Status: DISCONTINUED | OUTPATIENT
Start: 2025-07-21 | End: 2025-07-21 | Stop reason: HOSPADM

## 2025-07-21 RX ORDER — SODIUM CHLORIDE 0.9 G/100ML
INJECTION, SOLUTION IRRIGATION AS NEEDED
Status: DISCONTINUED | OUTPATIENT
Start: 2025-07-21 | End: 2025-07-21 | Stop reason: HOSPADM

## 2025-07-21 RX ORDER — CEFAZOLIN 1 G/1
INJECTION, POWDER, FOR SOLUTION INTRAVENOUS AS NEEDED
Status: DISCONTINUED | OUTPATIENT
Start: 2025-07-21 | End: 2025-07-21

## 2025-07-21 RX ORDER — TRANEXAMIC ACID 1 G/10ML
INJECTION, SOLUTION INTRAVENOUS AS NEEDED
Status: DISCONTINUED | OUTPATIENT
Start: 2025-07-21 | End: 2025-07-21

## 2025-07-21 RX ORDER — OXYCODONE AND ACETAMINOPHEN 5; 325 MG/1; MG/1
1 TABLET ORAL EVERY 4 HOURS PRN
Status: DISCONTINUED | OUTPATIENT
Start: 2025-07-21 | End: 2025-07-21 | Stop reason: HOSPADM

## 2025-07-21 RX ORDER — SODIUM CHLORIDE, SODIUM LACTATE, POTASSIUM CHLORIDE, CALCIUM CHLORIDE 600; 310; 30; 20 MG/100ML; MG/100ML; MG/100ML; MG/100ML
50 INJECTION, SOLUTION INTRAVENOUS CONTINUOUS
Status: ACTIVE | OUTPATIENT
Start: 2025-07-21 | End: 2025-07-21

## 2025-07-21 RX ADMIN — CEFAZOLIN 2 G: 1 INJECTION, POWDER, FOR SOLUTION INTRAMUSCULAR; INTRAVENOUS at 07:42

## 2025-07-21 RX ADMIN — PROPOFOL 50 MG: 10 INJECTION, EMULSION INTRAVENOUS at 07:55

## 2025-07-21 RX ADMIN — MIDAZOLAM HYDROCHLORIDE 2 MG: 1 INJECTION, SOLUTION INTRAMUSCULAR; INTRAVENOUS at 07:28

## 2025-07-21 RX ADMIN — LIDOCAINE HYDROCHLORIDE 60 MG: 20 INJECTION, SOLUTION EPIDURAL; INFILTRATION; INTRACAUDAL; PERINEURAL at 07:41

## 2025-07-21 RX ADMIN — SODIUM CHLORIDE, POTASSIUM CHLORIDE, SODIUM LACTATE AND CALCIUM CHLORIDE: 600; 310; 30; 20 INJECTION, SOLUTION INTRAVENOUS at 07:32

## 2025-07-21 RX ADMIN — TRANEXAMIC ACID 1000 MG: 1 INJECTION, SOLUTION INTRAVENOUS at 07:49

## 2025-07-21 RX ADMIN — ONDANSETRON 4 MG: 2 INJECTION, SOLUTION INTRAMUSCULAR; INTRAVENOUS at 08:15

## 2025-07-21 RX ADMIN — DEXAMETHASONE SODIUM PHOSPHATE 4 MG: 4 INJECTION, SOLUTION INTRAMUSCULAR; INTRAVENOUS at 08:15

## 2025-07-21 RX ADMIN — KETOROLAC TROMETHAMINE 15 MG: 30 INJECTION, SOLUTION INTRAMUSCULAR at 08:15

## 2025-07-21 RX ADMIN — FENTANYL CITRATE 50 MCG: 50 INJECTION, SOLUTION INTRAMUSCULAR; INTRAVENOUS at 07:55

## 2025-07-21 RX ADMIN — FENTANYL CITRATE 50 MCG: 50 INJECTION, SOLUTION INTRAMUSCULAR; INTRAVENOUS at 08:20

## 2025-07-21 RX ADMIN — PROPOFOL 150 MG: 10 INJECTION, EMULSION INTRAVENOUS at 07:41

## 2025-07-21 RX ADMIN — Medication 6 L/MIN: at 08:38

## 2025-07-21 ASSESSMENT — PAIN SCALES - GENERAL
PAINLEVEL_OUTOF10: 0 - NO PAIN
PAINLEVEL_OUTOF10: 2

## 2025-07-21 ASSESSMENT — PAIN - FUNCTIONAL ASSESSMENT
PAIN_FUNCTIONAL_ASSESSMENT: UNABLE TO SELF-REPORT
PAIN_FUNCTIONAL_ASSESSMENT: 0-10
PAIN_FUNCTIONAL_ASSESSMENT: UNABLE TO SELF-REPORT

## 2025-07-21 NOTE — H&P
[facility] Department of Orthopaedic Surgery   Surgical History & Physical >30 Days  7/21/25    Reason for Surgery: left forearm mass  Planned Procedure: left forearm mass excision    History & Physical Reviewed:  Patient is doing well. No acute events since last encounter. Denies chest pain or shortness of breath.     I have reviewed the History and Physical for obtained within the last 30 days. Relevant findings and updates are noted below:  No significant changes.    Home medications were reviewed with significant updates noted below:  No significant changes.    Allergies:  Allergies[1]    Review of Systems:  Review of Systems   Gen: Denies recent weight loss  Neuro: Denies recent confusion  Ophtho: Denies changes in vision  ENT: Denies changes in hearing  Endo: Denies weight loss/weight gain  CV: Denies chest pain  Resp: Denies shortness of breath  GI: Denies melena/hematochezia  : Denies painful urination  MSK: Per above HPI  Heme: No abnormal bleeding  Psych: Denies hallucinations    ERAS patient?: No    COVID-19 Risk Consent:   Surgeon has reviewed the key risks related to sylwia COVID-19 and subsequent sequelae.       07/21/25 at 6:22 AM - Abelino Brown DO        [1] No Known Allergies

## 2025-07-21 NOTE — ANESTHESIA PROCEDURE NOTES
Airway  Date/Time: 7/21/2025 7:42 AM  Reason: elective    Airway not difficult    Staffing  Performed: CAA   Authorized by: David Pedersen MD    Performed by: SARAH Webb  Patient location during procedure: OR    Patient Condition  Indications for airway management: anesthesia and airway protection  Planned trial extubation  Sedation level: deep     Final Airway Details   Preoxygenated: yes  Final airway type: supraglottic airway  Successful airway:   Size: 3  Number of attempts at approach: 1          
Statement Selected

## 2025-07-21 NOTE — OP NOTE
"Left Forearm Excision (L) Operative Note     Date: 2025  OR Location: [facility] OR    Name: Sanjay Morrow \"Matty\", : 1950, Age: 74 y.o., MRN: [id number], Sex: male    Diagnosis  Pre-op Diagnosis      * Lipoma of forearm [D17.20] Post-op Diagnosis     * Lipoma of forearm [D17.20]     Procedures  Left Forearm Excision  80587 - CO EXC TUMOR SFT TISS FOREARM&/WRIST SUBFASC 3CM/>      Surgeons      * Humphrey Miller - Primary    Resident/Fellow/Other Assistant:  Surgeons and Role:  * No surgeons found with a matching role *    Staff:   Circulator: Latisha  Scrub Person: Stephen    Anesthesia Staff: Anesthesiologist: David Pedersen MD  C-AA: SARAH Webb    Procedure Summary  Anesthesia: General  ASA: III  Estimated Blood Loss: 10 mL  Intra-op Medications:   Administrations occurring from 0725 to 0840 on 25:   Medication Name Total Dose   bupivacaine PF (Marcaine) 0.5 % (5 mg/mL) injection 10 mL   sodium chloride 0.9 % irrigation solution 1,000 mL   ceFAZolin (Ancef) vial 1 g 2 g   dexAMETHasone (Decadron) 4 mg/mL IV Syringe 2 mL 4 mg   fentaNYL (Sublimaze) injection 50 mcg/mL 50 mcg   ketorolac (Toradol) injection 30 mg 15 mg   lactated Ringer's infusion Cannot be calculated   lidocaine PF (Xylocaine-MPF) local injection 2 % 60 mg   ondansetron (Zofran) 2 mg/mL injection 4 mg   propofol (Diprivan) injection 10 mg/mL 200 mg   tranexamic acid (Cyklokapron) injection 1,000 mg              Anesthesia Record               Intraprocedure I/O Totals          Intake    Tranexamic Acid 0.00 mL    The total shown is the total volume documented since Anesthesia Start was filed.    Total Intake 0 mL          Specimen:   ID Type Source Tests Collected by Time   1 : LEFT ARM MASS Tissue SOFT TISSUE MASS RESECTION SURGICAL PATHOLOGY EXAM Humphrey Miller MD 2025 0811                 Drains and/or Catheters: * None in log *    Tourniquet Times:   * Missing tourniquet times found for documented tourniquets in log: " "[id number] *     Implants:     Findings: As above, tumor dimensions approximately 12 cm x 5 cm    Indications: Sanjay Morrow \"Matty\" is an 74 y.o. male who is having surgery for Lipoma of forearm [D17.20].     The patient was seen in the preoperative area. The risks, benefits, complications, treatment options, non-operative alternatives, expected recovery and outcomes were discussed with the patient. The possibilities of reaction to medication, pulmonary aspiration, injury to surrounding structures, bleeding, recurrent infection, the need for additional procedures, failure to diagnose a condition, and creating a complication requiring transfusion or operation were discussed with the patient. The patient concurred with the proposed plan, giving informed consent.  The site of surgery was properly noted/marked if necessary per policy. The patient has been actively warmed in preoperative area. Preoperative antibiotics have been ordered and given within 1 hours of incision. Venous thrombosis prophylaxis have been ordered including bilateral sequential compression devices and chemical prophylaxis    Preoperative diagnosis: Left forearm lipomatous mass      Postoperative diagnosis: Same     Procedure: Excision left forearm mass (CPT 74855)     Date of Procedure: 7/21/2025     Attending Surgeon: Humphrey Miller MD     Assistants: Abelino Brown DO    Anesthesia: General     Estimated blood loss: 10 cc     Intraoperative findings: As above, tumor dimensions approximate 12 cm x 5 cm     Components used: None     Indications:     We reviewed the informed consent process and form in the hospital and both signed.  The surgical site was signed and I performed a timeout in the operating room. The patient received prophylactic antibiotics as well as TXA prior to skin incision.     Details of procedure:  Patient was taken to the operating room and placed on the operating table in supine position.  At this point general anesthesia " was administered.  After induction the anesthesia team to control the patient cervical spine as well as airway.  All bony prominences were properly identified well-padded.  The left upper extremity was then prepped and draped in sterile orthopedic fashion. Once drapes were in place a timeout procedure was performed confirming appropriate patient identity, surgical site as well as procedure to be performed.  Once all in the room were in agreement we would proceed with the case.    The left upper extremity was elevated and tourniquet inflated to 250 mmHg.  I was able to palpate the mass to make an incision directly over top of it with a 15 blade.  Crossing veins were palpable identified and cauterized.  We dissected down to the level of the fascia overlying the volar compartment and then were able to incise this with electrocautery.  The stable to then dissect down through the musculature until we are able to identify the pseudocapsule over the mass.  We able to split the muscle in line with its fibers and retractors were placed.  This enabled us to identify the pseudocapsule which was then split in line with our incision once again.  Once we are able to enter the pseudocapsule we are then able to use a combination of blunt finger dissection as well as a right angle clamp with electrocautery to then gradually tease the mass up from the surrounding musculature.  We were able to do this in a distal to proximal direction keeping careful attention to avoid damage to surrounding structures most notably the PIN.  Eventually were able to then proceed to tease the mass out from a distal to proximal direction and then releasing the final bit of adhesions from the more proximal portion of the mass.  This enabled us to send the entire tumor off as a permanent section/final pathology.  Gross inspection of the wound revealed gross total resection with no evidence of obvious tumor remaining.  The PIN was in complete continuity as  we were able to see it deep within the wound.  The tourniquet was deflated and we verified we had excellent hemostasis.  The region was then thoroughly irrigated with copious amounts of sterile saline.  0 Vicryl suture was used to repair the split the muscle followed by an additional running 0 Vicryl suture in the fascial layer.  2-0 Vicryl suture was used for subcutaneous tissue followed by 4-0 Monocryl subcuticular closure with Dermabond.  Local anesthetic with 0.5% Marcaine was then utilized.  Patient was awakened by anesthesia staff without complication and overall tolerated procedure very well.  He is taken to PACU in stable condition.     Post Operative Plan: Patient can be weightbearing as tolerated with no limitation on range of motion left upper extremity.  He will be given wound care instructions.  He will be provided pain medication and can resume his home oral anticoagulation postoperative day #1.  Follow-up in clinic in 2 to 4 weeks for wound check as well as to go over final pathology.     Note dictated with [employer]  transcription software. Completed without full typed error editing and sent to avoid delay.    Attending Attestation: I was present and scrubbed for the entire procedure.    Humphrey Miller  Phone Number: [phone]

## 2025-07-21 NOTE — DISCHARGE INSTRUCTIONS
Orthopaedic Surgery Discharge Instructions    Weight bearing status: weight bearing as tolerated left upper extremity. Avoid strenuous activity until follow up.     VTE Prophylaxis (Blood Clot Prevention): none indicated    Antibiotics: none indicated    Home Medication: Resume all home medications    Resume normal diet     Leave soft operative dressing (ace wrap) in place until postoperative day 1. Leave silver mepilex dressing in place until postoperative day 7. You may shower over the silver mepilex dressing, no submerging. On postoperative day 7, remove and leave incision open to air. Let water run freely over incision when showering, do not scrub. Do not soak in pool or tub. Do not swim in pools or ponds until 3 months after surgery.    Call if any drainage after 7 days, increased redness/warmth/swelling at incision site, pain/tenderness of calf, swelling of calf that does not respond to elevation, SOB/chest pain.    Call for any questions or concerns.     MEDICATION SIDE EFFECTS.  OXYCODONE: constipation, nausea, vomiting, upset stomach, (sleepiness), dizziness, lightheadedness, itching, headache, blurred vision, dry mouth, sweating    Follow up with Dr. Miller in 2 weeks. Call [phone] to schedule/confirm appointment.

## 2025-07-21 NOTE — TELEPHONE ENCOUNTER
Dr. Miller confirmed numbing sensation is normal as a numbing medication was used to help with post-op pain; patient was updated.

## 2025-07-21 NOTE — ANESTHESIA POSTPROCEDURE EVALUATION
"Patient: Sanjay Morrow \"Matty\"    Procedure Summary       Date: 07/21/25 Room / Location: [facility] OR 07 / Virtual [facility] OR    Anesthesia Start: 0732 Anesthesia Stop: 0845    Procedure: Left Forearm Excision (Left: Arm Lower) Diagnosis:       Lipoma of forearm      (Lipoma of forearm [D17.20])    Surgeons: Humphrey Miller MD Responsible Provider: David Pedersen MD    Anesthesia Type: general ASA Status: 3            Anesthesia Type: general    Vitals Value Taken Time   /89 07/21/25 09:31   Temp 36.5 °C (97.7 °F) 07/21/25 09:30   Pulse 53 07/21/25 09:43   Resp 13 07/21/25 09:30   SpO2 95 % 07/21/25 09:42   Vitals shown include unfiled device data.    Anesthesia Post Evaluation    Patient location during evaluation: PACU  Patient participation: complete - patient participated  Level of consciousness: awake and alert  Pain management: adequate  Multimodal analgesia pain management approach  Airway patency: patent  Cardiovascular status: acceptable  Respiratory status: acceptable  Hydration status: acceptable  Postoperative Nausea and Vomiting: none        No notable events documented.    "

## 2025-07-22 ASSESSMENT — PAIN SCALES - GENERAL
PAINLEVEL_OUTOF10: 2
PAINLEVEL_OUTOF10: 3

## 2025-07-24 LAB
LABORATORY COMMENT REPORT: NORMAL
PATH REPORT.FINAL DX SPEC: NORMAL
PATH REPORT.GROSS SPEC: NORMAL
PATH REPORT.RELEVANT HX SPEC: NORMAL
PATH REPORT.TOTAL CANCER: NORMAL

## 2025-08-07 ENCOUNTER — APPOINTMENT (OUTPATIENT)
Dept: PRIMARY CARE | Facility: CLINIC | Age: 75
End: 2025-08-07
Payer: MEDICARE

## 2025-08-07 VITALS
DIASTOLIC BLOOD PRESSURE: 70 MMHG | HEIGHT: 64 IN | SYSTOLIC BLOOD PRESSURE: 129 MMHG | WEIGHT: 115 LBS | BODY MASS INDEX: 19.63 KG/M2

## 2025-08-07 DIAGNOSIS — D68.51 HETEROZYGOUS FACTOR V LEIDEN MUTATION (MULTI): ICD-10-CM

## 2025-08-07 DIAGNOSIS — R73.03 PRE-DIABETES: ICD-10-CM

## 2025-08-07 DIAGNOSIS — Z00.00 MEDICARE ANNUAL WELLNESS VISIT, SUBSEQUENT: ICD-10-CM

## 2025-08-07 DIAGNOSIS — Z00.00 ENCOUNTER FOR PREVENTIVE HEALTH EXAMINATION: ICD-10-CM

## 2025-08-07 DIAGNOSIS — I10 PRIMARY HYPERTENSION: ICD-10-CM

## 2025-08-07 DIAGNOSIS — Z00.00 ROUTINE GENERAL MEDICAL EXAMINATION AT HEALTH CARE FACILITY: Primary | ICD-10-CM

## 2025-08-07 DIAGNOSIS — R97.20 ELEVATED PSA: ICD-10-CM

## 2025-08-07 DIAGNOSIS — D17.20 LIPOMA OF FOREARM: ICD-10-CM

## 2025-08-07 DIAGNOSIS — Z86.718 H/O DEEP VENOUS THROMBOSIS: ICD-10-CM

## 2025-08-07 DIAGNOSIS — E78.2 MIXED HYPERLIPIDEMIA: ICD-10-CM

## 2025-08-07 DIAGNOSIS — R22.32 ARM MASS, LEFT: ICD-10-CM

## 2025-08-07 PROCEDURE — 1160F RVW MEDS BY RX/DR IN RCRD: CPT | Performed by: STUDENT IN AN ORGANIZED HEALTH CARE EDUCATION/TRAINING PROGRAM

## 2025-08-07 PROCEDURE — 1170F FXNL STATUS ASSESSED: CPT | Performed by: STUDENT IN AN ORGANIZED HEALTH CARE EDUCATION/TRAINING PROGRAM

## 2025-08-07 PROCEDURE — 99214 OFFICE O/P EST MOD 30 MIN: CPT | Performed by: STUDENT IN AN ORGANIZED HEALTH CARE EDUCATION/TRAINING PROGRAM

## 2025-08-07 PROCEDURE — 99397 PER PM REEVAL EST PAT 65+ YR: CPT | Performed by: STUDENT IN AN ORGANIZED HEALTH CARE EDUCATION/TRAINING PROGRAM

## 2025-08-07 PROCEDURE — 1036F TOBACCO NON-USER: CPT | Performed by: STUDENT IN AN ORGANIZED HEALTH CARE EDUCATION/TRAINING PROGRAM

## 2025-08-07 PROCEDURE — 3074F SYST BP LT 130 MM HG: CPT | Performed by: STUDENT IN AN ORGANIZED HEALTH CARE EDUCATION/TRAINING PROGRAM

## 2025-08-07 PROCEDURE — 1159F MED LIST DOCD IN RCRD: CPT | Performed by: STUDENT IN AN ORGANIZED HEALTH CARE EDUCATION/TRAINING PROGRAM

## 2025-08-07 PROCEDURE — 3078F DIAST BP <80 MM HG: CPT | Performed by: STUDENT IN AN ORGANIZED HEALTH CARE EDUCATION/TRAINING PROGRAM

## 2025-08-07 PROCEDURE — G0439 PPPS, SUBSEQ VISIT: HCPCS | Performed by: STUDENT IN AN ORGANIZED HEALTH CARE EDUCATION/TRAINING PROGRAM

## 2025-08-07 ASSESSMENT — ACTIVITIES OF DAILY LIVING (ADL)
DOING_HOUSEWORK: INDEPENDENT
GROCERY_SHOPPING: INDEPENDENT
TAKING_MEDICATION: INDEPENDENT
BATHING: INDEPENDENT
DRESSING: INDEPENDENT
MANAGING_FINANCES: INDEPENDENT

## 2025-08-07 ASSESSMENT — ENCOUNTER SYMPTOMS
LOSS OF SENSATION IN FEET: 0
DEPRESSION: 0
OCCASIONAL FEELINGS OF UNSTEADINESS: 0

## 2025-08-07 NOTE — PROGRESS NOTES
"Subjective   Patient ID: Matty Morrow is a 75 y.o. male who presents for Medicare Annual Wellness Visit Subsequent.    HPI   Routine fu, Wellness exam, and yearly physical.    He had a left arm mass excised about 2 weeks ago. Pathology shows a lipoma. He has surgery fu scheduled tomorrow.     He otherwise feels well.    Exercising regularly.    He has a few questions today and wants to review labs.  Review of Systems  12-point ROS reviewed and was negative unless otherwise noted in HPI.    Objective   /70   Ht 1.626 m (5' 4\")   Wt 52.2 kg (115 lb)   BMI 19.74 kg/m²     Physical Exam  GEN: conversant, NAD  HEENT: PERRL, EOMI, MMM, OP clear  NECK: supple, no carotid bruits appreciated b/l  CV: S1, S2, RRR  PULM: CTAB  ABD: soft, NT, ND  NEURO: no new gross focal deficits  EXT: no sig LE edema  PSYCH: appropriate affect    Assessment/Plan     #Left arm lipoma: s/p excision 7/2025. Seeing surgery.     #Squamous cell carcinoma: of right ear, seeing dermatology     #L DVT, likely provoked: Patient found to be heterozygous for factor V Leiden mutation and heterozygous prothrombin gene mutation. Lifelong anticoagulation has been recommended by hematology, patient is in agreement with this. Continue Eliquis.     #HTN: stable. Continue lisinopril 5 mg daily.     #Pre-diabetes: Advised dietary modification, continue exercise.     #Hyperlipidemia- Elevated LDL, but calcium score of 0. No current indication for statin.     #Elevated PSA: Follows regularly with [facility]. Multiple prostate biopsies have not shown evidence of cancer. Monitoring with surveillance.     #Health maintenance: Received pneumonia vaccines. TDaP given 2016. Received Shingrix. Normal colonoscopy 2019, no repeat advised. Longitudinal care. Labs reviewed. Questions addressed.     RTC 6 mo     "

## 2025-08-08 ENCOUNTER — OFFICE VISIT (OUTPATIENT)
Dept: ORTHOPEDIC SURGERY | Facility: HOSPITAL | Age: 75
End: 2025-08-08
Payer: MEDICARE

## 2025-08-08 VITALS — HEIGHT: 64 IN | BODY MASS INDEX: 19.63 KG/M2 | WEIGHT: 115 LBS

## 2025-08-08 DIAGNOSIS — D17.20 LIPOMA OF FOREARM: Primary | ICD-10-CM

## 2025-08-08 PROCEDURE — 99212 OFFICE O/P EST SF 10 MIN: CPT | Performed by: STUDENT IN AN ORGANIZED HEALTH CARE EDUCATION/TRAINING PROGRAM

## 2025-08-08 PROCEDURE — 1160F RVW MEDS BY RX/DR IN RCRD: CPT | Performed by: STUDENT IN AN ORGANIZED HEALTH CARE EDUCATION/TRAINING PROGRAM

## 2025-08-08 PROCEDURE — 99024 POSTOP FOLLOW-UP VISIT: CPT | Performed by: STUDENT IN AN ORGANIZED HEALTH CARE EDUCATION/TRAINING PROGRAM

## 2025-08-08 PROCEDURE — 1159F MED LIST DOCD IN RCRD: CPT | Performed by: STUDENT IN AN ORGANIZED HEALTH CARE EDUCATION/TRAINING PROGRAM

## 2025-08-08 PROCEDURE — 1036F TOBACCO NON-USER: CPT | Performed by: STUDENT IN AN ORGANIZED HEALTH CARE EDUCATION/TRAINING PROGRAM

## 2025-08-08 ASSESSMENT — ENCOUNTER SYMPTOMS
DEPRESSION: 0
OCCASIONAL FEELINGS OF UNSTEADINESS: 0
LOSS OF SENSATION IN FEET: 0

## 2025-08-08 ASSESSMENT — PAIN - FUNCTIONAL ASSESSMENT: PAIN_FUNCTIONAL_ASSESSMENT: NO/DENIES PAIN

## 2025-08-08 NOTE — PROGRESS NOTES
Orthopaedic Surgery Clinic Progress Note:    CC: postop left forearm mass excision 7/21/25    S: 75 y.o. male here for above. Patient is doing well since surgery without acute events. Pain is controlled. Endorsing some mild paresthesias about his distal radial forearm which has had some mild improvement since surgery. Denies any motor deficits.      Objective:    Exam:  Gen: alert, appropriately conversational, no apparent distress  Chest: symmetric chest rise, non-labored breathing  Heart: RRR to peripheral palpation    LUE:  -Surgical incision with appropriate interval healing, no redness/drainage  -No motor deficits. AIN PIN Uln. Nerve intact  -Paresthesias about distal radial forearm. Otherwise SILT  -Palpable pulses distally    Imaging:  None new    Pathology:  Intra-op specimen consistent with lipoma    A/P:  75M s/p left forearm lipoma resection 7/21/25    Patient is doing well. Discussed the paresthesia he is experiencing is common and should resolve with time. Would recommend patient continue to avoid fully submerging wound for another few weeks otherwise is free to resume normal activity. Patient can see us back as needed and can call if any issues arise. Patient in agreement.

## 2025-09-30 ENCOUNTER — APPOINTMENT (OUTPATIENT)
Dept: DERMATOLOGY | Facility: CLINIC | Age: 75
End: 2025-09-30
Payer: MEDICARE

## 2025-10-09 ENCOUNTER — APPOINTMENT (OUTPATIENT)
Dept: VASCULAR SURGERY | Facility: CLINIC | Age: 75
End: 2025-10-09
Payer: MEDICARE

## 2026-02-05 ENCOUNTER — APPOINTMENT (OUTPATIENT)
Dept: PRIMARY CARE | Facility: CLINIC | Age: 76
End: 2026-02-05
Payer: MEDICARE

## (undated) DEVICE — SUTURE, VICRYL, 0, 36 IN, CT-1, UNDYED

## (undated) DEVICE — DRAPE, TIBURON, SPLIT SHEET, REINF ADH STRIP, 77X122

## (undated) DEVICE — NEEDLE, HYPODERMIC, REGULAR WALL, REGULAR BEVEL, 30 G X 1 IN

## (undated) DEVICE — CAUTERY, PENCIL, PUSH BUTTON, SMOKE EVAC, 70MM

## (undated) DEVICE — BANDAGE, ESMARK 4 IN X 9 FT, STERILE

## (undated) DEVICE — BANDAGE, ELASTIC, SELF-CLOSE, 4 IN, HONEYCOMB, STERILE

## (undated) DEVICE — SUTURE, VICRYL PLUS, 2-0, SH, 1X27IN, UNDYED

## (undated) DEVICE — SPONGE, LAP, XRAY DECT, SC+RFID, 18X18, NO LOOP, STERILE

## (undated) DEVICE — DRAPE, INCISE, ANTIMICROBIAL, IOBAN 2, LARGE, 17 X 23 IN, DISPOSABLE, STERILE

## (undated) DEVICE — DRESSING, GAUZE, SPONGE, 12 PLY, CURITY, 4 X 4 IN, STERILE

## (undated) DEVICE — SYRINGE, 10 CC, LUER LOCK

## (undated) DEVICE — Device

## (undated) DEVICE — CORD, CAUTERY, BIOPOLAR FORCEP, 12FT

## (undated) DEVICE — GLOVE, SURGICAL, PROTEXIS PI ORTHO, 7.5, PF, LF

## (undated) DEVICE — DRESSING, MEPILEX BORDER, POST-OP AG, 4 X 8 IN

## (undated) DEVICE — GOWN, ASTOUND, L

## (undated) DEVICE — SUTURE, MONOCRYL, 4-0, 18 IN, PS2, UNDYED

## (undated) DEVICE — BANDAGE, COFLEX, 4 X 5 YDS, TAN, STERILE, LF

## (undated) DEVICE — GLOVE, SURGICAL, PROTEXIS PI BLUE W/NEUTHERA, 8.0, PF, LF

## (undated) DEVICE — ADHESIVE, SKIN, DERMABOND ADVANCED, 15CM, PEN-STYLE

## (undated) DEVICE — MARKER, SKIN, DUAL TIP INK W/9 LABEL AND REMOVABLE TIME OUT SLEEVE

## (undated) DEVICE — TUBING, SUCTION, NON-CONDUCTIVE, W/CONNECT,.25 IN X 12 FT, STERILE, LF

## (undated) DEVICE — COVER HANDLE LIGHT, STERIS, BLUE, STERILE

## (undated) DEVICE — DRAPE, SHEET, U, W/ADHESIVE STRIP, IMPERVIOUS, 60 X 70 IN, DISPOSABLE, LF, STERILE

## (undated) DEVICE — DRAPE, SHEET, THREE QUARTER, FAN FOLD, 57 X 77 IN